# Patient Record
Sex: MALE | Race: WHITE | NOT HISPANIC OR LATINO | Employment: OTHER | URBAN - METROPOLITAN AREA
[De-identification: names, ages, dates, MRNs, and addresses within clinical notes are randomized per-mention and may not be internally consistent; named-entity substitution may affect disease eponyms.]

---

## 2017-01-17 ENCOUNTER — LAB VISIT (OUTPATIENT)
Dept: LAB | Facility: HOSPITAL | Age: 67
End: 2017-01-17
Attending: FAMILY MEDICINE
Payer: MEDICARE

## 2017-01-17 DIAGNOSIS — Z00.00 WELL ADULT EXAM: ICD-10-CM

## 2017-01-17 DIAGNOSIS — Z12.5 PROSTATE CANCER SCREENING: ICD-10-CM

## 2017-01-17 DIAGNOSIS — Z11.59 NEED FOR HEPATITIS C SCREENING TEST: ICD-10-CM

## 2017-01-17 DIAGNOSIS — I10 ESSENTIAL HYPERTENSION: ICD-10-CM

## 2017-01-17 DIAGNOSIS — I45.10 RBBB: ICD-10-CM

## 2017-01-17 DIAGNOSIS — I87.2 VENOUS INSUFFICIENCY: ICD-10-CM

## 2017-01-17 DIAGNOSIS — L40.9 PSORIASIS: ICD-10-CM

## 2017-01-17 LAB
25(OH)D3+25(OH)D2 SERPL-MCNC: 27 NG/ML
ALBUMIN SERPL BCP-MCNC: 3.7 G/DL
ALP SERPL-CCNC: 71 U/L
ALT SERPL W/O P-5'-P-CCNC: 12 U/L
ANION GAP SERPL CALC-SCNC: 6 MMOL/L
AST SERPL-CCNC: 16 U/L
BASOPHILS # BLD AUTO: 0.01 K/UL
BASOPHILS NFR BLD: 0.1 %
BILIRUB SERPL-MCNC: 0.9 MG/DL
BUN SERPL-MCNC: 18 MG/DL
CALCIUM SERPL-MCNC: 9.1 MG/DL
CHLORIDE SERPL-SCNC: 108 MMOL/L
CHOLEST/HDLC SERPL: 3.4 {RATIO}
CO2 SERPL-SCNC: 28 MMOL/L
CREAT SERPL-MCNC: 1 MG/DL
DIFFERENTIAL METHOD: ABNORMAL
EOSINOPHIL # BLD AUTO: 0.1 K/UL
EOSINOPHIL NFR BLD: 0.7 %
ERYTHROCYTE [DISTWIDTH] IN BLOOD BY AUTOMATED COUNT: 12.7 %
EST. GFR  (AFRICAN AMERICAN): >60 ML/MIN/1.73 M^2
EST. GFR  (NON AFRICAN AMERICAN): >60 ML/MIN/1.73 M^2
GLUCOSE SERPL-MCNC: 90 MG/DL
HCT VFR BLD AUTO: 42.3 %
HCV AB SERPL QL IA: NEGATIVE
HDL/CHOLESTEROL RATIO: 29.8 %
HDLC SERPL-MCNC: 131 MG/DL
HDLC SERPL-MCNC: 39 MG/DL
HGB BLD-MCNC: 14 G/DL
LDLC SERPL CALC-MCNC: 76.6 MG/DL
LYMPHOCYTES # BLD AUTO: 1.9 K/UL
LYMPHOCYTES NFR BLD: 27 %
MCH RBC QN AUTO: 32.3 PG
MCHC RBC AUTO-ENTMCNC: 33.1 %
MCV RBC AUTO: 98 FL
MONOCYTES # BLD AUTO: 0.9 K/UL
MONOCYTES NFR BLD: 12.8 %
NEUTROPHILS # BLD AUTO: 4.1 K/UL
NEUTROPHILS NFR BLD: 59.3 %
NONHDLC SERPL-MCNC: 92 MG/DL
PLATELET # BLD AUTO: 282 K/UL
PMV BLD AUTO: 10.5 FL
POTASSIUM SERPL-SCNC: 4.3 MMOL/L
PROT SERPL-MCNC: 7 G/DL
RBC # BLD AUTO: 4.34 M/UL
SODIUM SERPL-SCNC: 142 MMOL/L
T4 FREE SERPL-MCNC: 0.92 NG/DL
TRIGL SERPL-MCNC: 77 MG/DL
TSH SERPL DL<=0.005 MIU/L-ACNC: 1.29 UIU/ML
WBC # BLD AUTO: 6.97 K/UL

## 2017-01-17 PROCEDURE — 84443 ASSAY THYROID STIM HORMONE: CPT

## 2017-01-17 PROCEDURE — 80061 LIPID PANEL: CPT

## 2017-01-17 PROCEDURE — 36415 COLL VENOUS BLD VENIPUNCTURE: CPT | Mod: PO

## 2017-01-17 PROCEDURE — 80053 COMPREHEN METABOLIC PANEL: CPT

## 2017-01-17 PROCEDURE — 82306 VITAMIN D 25 HYDROXY: CPT

## 2017-01-17 PROCEDURE — 86803 HEPATITIS C AB TEST: CPT

## 2017-01-17 PROCEDURE — 85025 COMPLETE CBC W/AUTO DIFF WBC: CPT

## 2017-01-17 PROCEDURE — 84439 ASSAY OF FREE THYROXINE: CPT

## 2017-01-23 ENCOUNTER — OFFICE VISIT (OUTPATIENT)
Dept: INTERNAL MEDICINE | Facility: CLINIC | Age: 67
End: 2017-01-23
Payer: MEDICARE

## 2017-01-23 VITALS
HEIGHT: 69 IN | HEART RATE: 66 BPM | RESPIRATION RATE: 14 BRPM | WEIGHT: 165.13 LBS | SYSTOLIC BLOOD PRESSURE: 132 MMHG | DIASTOLIC BLOOD PRESSURE: 82 MMHG | TEMPERATURE: 99 F | BODY MASS INDEX: 24.46 KG/M2

## 2017-01-23 DIAGNOSIS — J30.1 SEASONAL ALLERGIC RHINITIS DUE TO POLLEN: ICD-10-CM

## 2017-01-23 DIAGNOSIS — Z23 NEED FOR PROPHYLACTIC VACCINATION AND INOCULATION AGAINST INFLUENZA: ICD-10-CM

## 2017-01-23 DIAGNOSIS — L40.9 PSORIASIS: ICD-10-CM

## 2017-01-23 DIAGNOSIS — I87.2 VENOUS INSUFFICIENCY: ICD-10-CM

## 2017-01-23 DIAGNOSIS — Z23 NEED FOR 23-POLYVALENT PNEUMOCOCCAL POLYSACCHARIDE VACCINE: ICD-10-CM

## 2017-01-23 DIAGNOSIS — Z00.00 WELL ADULT EXAM: Primary | ICD-10-CM

## 2017-01-23 DIAGNOSIS — I10 ESSENTIAL HYPERTENSION: ICD-10-CM

## 2017-01-23 DIAGNOSIS — I45.10 RBBB: ICD-10-CM

## 2017-01-23 PROCEDURE — 90732 PPSV23 VACC 2 YRS+ SUBQ/IM: CPT | Mod: S$GLB,,, | Performed by: FAMILY MEDICINE

## 2017-01-23 PROCEDURE — 3079F DIAST BP 80-89 MM HG: CPT | Mod: S$GLB,,, | Performed by: FAMILY MEDICINE

## 2017-01-23 PROCEDURE — 90662 IIV NO PRSV INCREASED AG IM: CPT | Mod: S$GLB,,, | Performed by: FAMILY MEDICINE

## 2017-01-23 PROCEDURE — 3075F SYST BP GE 130 - 139MM HG: CPT | Mod: S$GLB,,, | Performed by: FAMILY MEDICINE

## 2017-01-23 PROCEDURE — G0009 ADMIN PNEUMOCOCCAL VACCINE: HCPCS | Mod: S$GLB,,, | Performed by: FAMILY MEDICINE

## 2017-01-23 PROCEDURE — 99999 PR PBB SHADOW E&M-EST. PATIENT-LVL III: CPT | Mod: PBBFAC,,, | Performed by: FAMILY MEDICINE

## 2017-01-23 PROCEDURE — 99397 PER PM REEVAL EST PAT 65+ YR: CPT | Mod: 25,S$GLB,, | Performed by: FAMILY MEDICINE

## 2017-01-23 PROCEDURE — G0008 ADMIN INFLUENZA VIRUS VAC: HCPCS | Mod: S$GLB,,, | Performed by: FAMILY MEDICINE

## 2017-01-23 RX ORDER — IRBESARTAN 150 MG/1
150 TABLET ORAL NIGHTLY
Qty: 30 TABLET | Refills: 11 | Status: SHIPPED | OUTPATIENT
Start: 2017-01-23 | End: 2017-05-30 | Stop reason: SDUPTHER

## 2017-01-23 NOTE — MR AVS SNAPSHOT
[place] - Internal Medicine   [facility]  [place] [address]  Phone: [phone]  Fax: [phone]                  Joseph Rizvi Jr.   2017 1:00 PM   Office Visit    Description:  Male : 1950   Provider:  Hugo Lara MD   Department:  [place] - [facility] Medicine           Reason for Visit     Annual Exam           Diagnoses this Visit        Comments    Well adult exam    -  Primary     Essential hypertension         RBBB         Venous insufficiency         Psoriasis         Need for prophylactic vaccination and inoculation against influenza         Need for 23-polyvalent pneumococcal polysaccharide vaccine                To Do List           Future Appointments        Provider Department Dept Phone    2017 1:00 PM Hugo Lara MD [facility] Internal Medicine [phone]      Goals (5 Years of Data)     None      Follow-Up and Disposition     Return if symptoms worsen or fail to improve.       These Medications        Disp Refills Start End    irbesartan (AVAPRO) 150 MG tablet 30 tablet 11 2017     Take 1 tablet (150 mg total) by mouth every evening. - Oral    Pharmacy: [facility]/pharmacy #[id number] - [place] - [address]  #: [phone]         [facility] On Call     [facility] On Call Nurse Care Line -  Assistance  Registered nurses in the [facility] On Call Center provide clinical advisement, health education, appointment booking, and other advisory services.  Call for this free service at [phone].             Medications           Message regarding Medications     Verify the changes and/or additions to your medication regime listed below are the same as discussed with your clinician today.  If any of these changes or additions are incorrect, please notify your healthcare provider.        CHANGE how you are taking these medications     Start Taking Instead of    irbesartan (AVAPRO) 150 MG tablet irbesartan (AVAPRO) 150 MG tablet     "Dosage:  Take 1 tablet (150 mg total) by mouth every evening. Dosage:  TAKE 1 TABLET BY MOUTH EVERY EVENING.    Reason for Change:  Reorder            Verify that the below list of medications is an accurate representation of the medications you are currently taking.  If none reported, the list may be blank. If incorrect, please contact your healthcare provider. Carry this list with you in case of emergency.           Current Medications     cyanocobalamin (VITAMIN B-12) 1000 MCG tablet Take 100 mcg by mouth once daily.    irbesartan (AVAPRO) 150 MG tablet Take 1 tablet (150 mg total) by mouth every evening.    vitamin E 1000 UNIT capsule Take 1,000 Units by mouth once daily.    vitamin E 200 UNIT capsule Take 200 Units by mouth once daily.           Clinical Reference Information           Vital Signs - Last Recorded  Most recent update: 1/23/2017 12:43 PM by Reyna Roger    BP Pulse Temp Resp Ht Wt    132/82 (BP Location: Left arm, Patient Position: Sitting, BP Method: Manual) 66 98.9 °F (37.2 °C) (Oral) 14 5' 9" (1.753 m) 74.9 kg (165 lb 2 oz)    BMI                24.38 kg/m2          Blood Pressure          Most Recent Value    BP  132/82      Allergies as of 1/23/2017     No Known Allergies      Immunizations Administered on Date of Encounter - 1/23/2017     Name Date Dose VIS Date Route    Pneumococcal Polysaccharide - 23 Valent  Incomplete 0.5 mL 4/24/2015 Intramuscular      Orders Placed During Today's Visit      Normal Orders This Visit    Pneumococcal Polysaccharide Vaccine (23 Valent) (SQ/IM)       "

## 2017-01-23 NOTE — PROGRESS NOTES
Subjective:       Patient ID: Joseph Rizvi Jr. is a 67 y.o. male.    Chief Complaint: Annual Exam    HPI 67-year-old white male presents to clinic today for annual physical exam.  He continues to have well-controlled hypertension on Avapro 150 mg daily.  He also continues to be treated for psoriasis by Dr. Medel, dermatology.  He has been followed by vascular medicine secondary to claudications in the past.  At this time he reports minimal claudications and also states that he has not uses compression stockings.  He has a past surgical history of lipoma removal from the growing region.  He has a family history of schizophrenia.  His mother passed away from an overdose.  His father passed away from dementia and heart disease.  He is up-to-date with colonoscopy which was performed in 2015.  It is recommended that he repeat the colonoscopy in 5 years.  He is up-to-date with flu vaccine and Prevnar vaccine.  Shingles vaccine and Pneumovax have been discussed.  Finally, he reports that he is recovering from a virus but continues to have postnasal drip and runny nose.  He has used NyQuil with significant improvement of symptoms.  Review of Systems   Constitutional: Positive for diaphoresis. Negative for appetite change, chills, fatigue and fever.   HENT: Positive for postnasal drip and rhinorrhea. Negative for congestion, ear pain, hearing loss, sinus pressure, sore throat and tinnitus.    Eyes: Negative for redness, itching and visual disturbance.   Respiratory: Positive for chest tightness. Negative for cough and shortness of breath.    Cardiovascular: Negative for chest pain and palpitations.   Gastrointestinal: Negative for abdominal pain, constipation, diarrhea, nausea and vomiting.   Genitourinary: Negative for decreased urine volume, difficulty urinating, dysuria, frequency, hematuria and urgency.   Musculoskeletal: Negative for back pain, myalgias, neck pain and neck stiffness.   Skin: Negative for rash.    Neurological: Negative for dizziness, light-headedness and headaches.   Psychiatric/Behavioral: Negative.        Objective:      Physical Exam   Constitutional: He is oriented to person, place, and time. He appears well-developed and well-nourished. No distress.   HENT:   Head: Normocephalic and atraumatic.   Right Ear: External ear normal.   Left Ear: External ear normal.   Nose: Nose normal.   Mouth/Throat: Oropharynx is clear and moist. No oropharyngeal exudate.   Eyes: Conjunctivae and EOM are normal. Pupils are equal, round, and reactive to light. Right eye exhibits no discharge. Left eye exhibits no discharge. No scleral icterus.   Neck: Normal range of motion. Neck supple. No JVD present. No tracheal deviation present. No thyromegaly present.   Cardiovascular: Normal rate, regular rhythm, normal heart sounds and intact distal pulses.  Exam reveals no gallop and no friction rub.    No murmur heard.  Pulmonary/Chest: Effort normal and breath sounds normal. No stridor. No respiratory distress. He has no wheezes. He has no rales.   Abdominal: Soft. Bowel sounds are normal. He exhibits no distension and no mass. There is no tenderness. There is no rebound and no guarding.   Musculoskeletal: Normal range of motion. He exhibits no edema or tenderness.   Lymphadenopathy:     He has no cervical adenopathy.   Neurological: He is alert and oriented to person, place, and time.   Skin: Skin is warm and dry. No rash noted. He is not diaphoretic. No erythema. No pallor.   Psychiatric: He has a normal mood and affect. His behavior is normal. Judgment and thought content normal.   Nursing note and vitals reviewed.      Assessment:       1. Well adult exam    2. Essential hypertension    3. RBBB    4. Venous insufficiency    5. Psoriasis    6. Need for prophylactic vaccination and inoculation against influenza    7. Need for 23-polyvalent pneumococcal polysaccharide vaccine    8. Seasonal allergic rhinitis due to pollen         Plan:       1.  Labs have been reviewed and are overall within normal limits.  2.  Continue Avapro 150 mg daily.  Hypertension is well controlled.  3.  Right bundle branch block is stable with use of Avapro.  4.  Continue use of compression stockings and follow-up with vascular medicine as scheduled.  5.  Continue follow-up with dermatology as scheduled.  6.  Flu vaccine given.  7.  Pneumovax given.  8.  Recommend use of over-the-counter Flonase nasal spray and over-the-counter Claritin.  9.  Return to clinic as needed or in 6 months for general exam.

## 2017-04-03 RX ORDER — IRBESARTAN 150 MG/1
TABLET ORAL
Qty: 30 TABLET | Refills: 11 | Status: SHIPPED | OUTPATIENT
Start: 2017-04-03 | End: 2017-04-07 | Stop reason: SDUPTHER

## 2017-04-07 ENCOUNTER — TELEPHONE (OUTPATIENT)
Dept: INTERNAL MEDICINE | Facility: CLINIC | Age: 67
End: 2017-04-07

## 2017-04-07 RX ORDER — IRBESARTAN 150 MG/1
150 TABLET ORAL NIGHTLY
Qty: 30 TABLET | Refills: 11 | Status: SHIPPED | OUTPATIENT
Start: 2017-04-07 | End: 2017-04-27 | Stop reason: SDUPTHER

## 2017-04-07 NOTE — TELEPHONE ENCOUNTER
----- Message from Maverick Traylor sent at 4/7/2017  3:45 PM CDT -----  Contact: self [phone]  RX request - refill or new RX.  Is this a refill or new RX:  Refill   RX name and strength: irbesartan (AVAPRO) 150 MG tablet  Directions: TAKE 1 TABLET BY MOUTH EVERY EVENING.  Is this a 30 day or 90 day RX:30    Pharmacy name and phone #: [facility]/pharmacy #[id number]  [phone] (Phone)  [phone] (Fax)  Comments:

## 2017-04-07 NOTE — TELEPHONE ENCOUNTER
----- Message from Angela Perez sent at 4/7/2017  2:32 PM CDT -----  Contact: Self/[phone]  Pt states that pharmacy is stating that scrip tirbesartan (AVAPRO) 150 MG tablet was cancelled. Pt would like to know why. Please advise. Pharmacy is [facility]: [phone] (Phone) or  [phone] (Fax)

## 2017-04-07 NOTE — TELEPHONE ENCOUNTER
Call the pharmacy to informed that prescription was sent on 4/3/17. Pharmacy stated that they will have prescription ready.

## 2017-04-27 ENCOUNTER — OFFICE VISIT (OUTPATIENT)
Dept: INTERNAL MEDICINE | Facility: CLINIC | Age: 67
End: 2017-04-27
Payer: MEDICARE

## 2017-04-27 ENCOUNTER — HOSPITAL ENCOUNTER (OUTPATIENT)
Dept: RADIOLOGY | Facility: HOSPITAL | Age: 67
Discharge: HOME | End: 2017-04-27
Attending: INTERNAL MEDICINE
Payer: MEDICARE

## 2017-04-27 VITALS
SYSTOLIC BLOOD PRESSURE: 164 MMHG | BODY MASS INDEX: 25.21 KG/M2 | WEIGHT: 170.19 LBS | TEMPERATURE: 98 F | HEART RATE: 60 BPM | RESPIRATION RATE: 16 BRPM | DIASTOLIC BLOOD PRESSURE: 90 MMHG | HEIGHT: 69 IN

## 2017-04-27 DIAGNOSIS — S33.5XXA LUMBAR BACK SPRAIN, INITIAL ENCOUNTER: ICD-10-CM

## 2017-04-27 DIAGNOSIS — S13.4XXA INJURY OF NECK, WHIPLASH, INITIAL ENCOUNTER: Primary | ICD-10-CM

## 2017-04-27 DIAGNOSIS — S43.401A SPRAIN OF RIGHT SHOULDER, UNSPECIFIED SHOULDER SPRAIN TYPE, INITIAL ENCOUNTER: ICD-10-CM
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
## 2017-04-27 DIAGNOSIS — S13.4XXA INJURY OF NECK, WHIPLASH, INITIAL ENCOUNTER: ICD-10-CM

## 2017-04-27 PROCEDURE — 3077F SYST BP >= 140 MM HG: CPT | Mod: S$GLB,,, | Performed by: INTERNAL MEDICINE

## 2017-04-27 PROCEDURE — 99999 PR PBB SHADOW E&M-EST. PATIENT-LVL III: CPT | Mod: PBBFAC,,, | Performed by: INTERNAL MEDICINE

## 2017-04-27 PROCEDURE — 1125F AMNT PAIN NOTED PAIN PRSNT: CPT | Mod: S$GLB,,, | Performed by: INTERNAL MEDICINE

## 2017-04-27 PROCEDURE — 1159F MED LIST DOCD IN RCRD: CPT | Mod: S$GLB,,, | Performed by: INTERNAL MEDICINE

## 2017-04-27 PROCEDURE — 3080F DIAST BP >= 90 MM HG: CPT | Mod: S$GLB,,, | Performed by: INTERNAL MEDICINE

## 2017-04-27 PROCEDURE — 72050 X-RAY EXAM NECK SPINE 4/5VWS: CPT | Mod: 26,,, | Performed by: RADIOLOGY

## 2017-04-27 PROCEDURE — 72050 X-RAY EXAM NECK SPINE 4/5VWS: CPT | Mod: TC,PO

## 2017-04-27 PROCEDURE — 96372 THER/PROPH/DIAG INJ SC/IM: CPT | Mod: S$GLB,,, | Performed by: INTERNAL MEDICINE

## 2017-04-27 PROCEDURE — 1160F RVW MEDS BY RX/DR IN RCRD: CPT | Mod: S$GLB,,, | Performed by: INTERNAL MEDICINE

## 2017-04-27 PROCEDURE — 99214 OFFICE O/P EST MOD 30 MIN: CPT | Mod: 25,S$GLB,, | Performed by: INTERNAL MEDICINE

## 2017-04-27 RX ORDER — NAPROXEN 500 MG/1
500 TABLET ORAL 2 TIMES DAILY WITH MEALS
Qty: 60 TABLET | Refills: 1 | Status: SHIPPED | OUTPATIENT
Start: 2017-04-27 | End: 2017-05-27

## 2017-04-27 RX ORDER — CYCLOBENZAPRINE HCL 10 MG
10 TABLET ORAL 3 TIMES DAILY PRN
Qty: 30 TABLET | Refills: 1 | Status: SHIPPED | OUTPATIENT
Start: 2017-04-27 | End: 2017-05-27

## 2017-04-27 RX ORDER — TRIAMCINOLONE ACETONIDE 40 MG/ML
40 INJECTION, SUSPENSION INTRA-ARTICULAR; INTRAMUSCULAR
Status: COMPLETED | OUTPATIENT
Start: 2017-04-27 | End: 2017-04-27

## 2017-04-27 RX ADMIN — TRIAMCINOLONE ACETONIDE 40 MG: 40 INJECTION, SUSPENSION INTRA-ARTICULAR; INTRAMUSCULAR at 08:04
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
## 2017-05-30 RX ORDER — IRBESARTAN 150 MG/1
150 TABLET ORAL NIGHTLY
Qty: 90 TABLET | Refills: 3 | Status: SHIPPED | OUTPATIENT
Start: 2017-05-30 | End: 2018-01-24 | Stop reason: SDUPTHER

## 2017-05-30 RX ORDER — IRBESARTAN 150 MG/1
150 TABLET ORAL NIGHTLY
Qty: 30 TABLET | Refills: 11 | Status: SHIPPED | OUTPATIENT
Start: 2017-05-30 | End: 2017-05-30 | Stop reason: SDUPTHER

## 2017-07-24 ENCOUNTER — OFFICE VISIT (OUTPATIENT)
Dept: CARDIOLOGY | Facility: CLINIC | Age: 67
End: 2017-07-24
Payer: MEDICARE

## 2017-07-24 VITALS
HEART RATE: 60 BPM | WEIGHT: 174 LBS | BODY MASS INDEX: 25.77 KG/M2 | SYSTOLIC BLOOD PRESSURE: 134 MMHG | DIASTOLIC BLOOD PRESSURE: 70 MMHG | HEIGHT: 69 IN

## 2017-07-24 DIAGNOSIS — I10 ESSENTIAL HYPERTENSION: Primary | ICD-10-CM

## 2017-07-24 DIAGNOSIS — I87.2 VENOUS INSUFFICIENCY: ICD-10-CM

## 2017-07-24 DIAGNOSIS — I45.10 RBBB: ICD-10-CM

## 2017-07-24 PROCEDURE — 1159F MED LIST DOCD IN RCRD: CPT | Mod: S$GLB,,, | Performed by: INTERNAL MEDICINE

## 2017-07-24 PROCEDURE — 99214 OFFICE O/P EST MOD 30 MIN: CPT | Mod: S$GLB,,, | Performed by: INTERNAL MEDICINE

## 2017-07-24 PROCEDURE — 1126F AMNT PAIN NOTED NONE PRSNT: CPT | Mod: S$GLB,,, | Performed by: INTERNAL MEDICINE

## 2017-07-24 PROCEDURE — 99999 PR PBB SHADOW E&M-EST. PATIENT-LVL III: CPT | Mod: PBBFAC,,, | Performed by: INTERNAL MEDICINE

## 2017-07-24 NOTE — PATIENT INSTRUCTIONS
Self-Care for Spider and Varicose Veins  Your healthcare provider may suggest that you try self-care. Exercising and maintaining a healthy weight may keep problem veins from getting worse. Wearing elastic stockings and elevating your legs can help improve blood flow. Taking breaks when you sit or stand helps, too.     The top of the elastic stocking should be below the bend in your knee for a proper fit.   Exercising  Exercising is good for your veins because it improves blood flow. Walking, cycling, or swimming are great exercises for vein health. But be sure to check with your healthcare provider before starting any exercise program. Also, keep these hints in mind:  · When exercising, start out slowly and try to build up to 30 minutes on most days.  · Elevate your legs above heart level after exercise to keep blood from pooling in veins.  Maintaining a healthy weight  Being overweight puts extra pressure on your veins. To maintain a healthy weight, try these tips:  · Choose lean meats, fish, and skinless chicken.  · Use low-fat dairy products.  · Eat foods high in fiber, such as whole grains, fruits, and vegetables.  · Cut down on sugar, salt, and saturated and trans fats.  · Exercise regularly.  Wearing elastic stockings  Elastic stockings gently squeeze veins so blood flows upward. If you need elastic stockings, your healthcare provider can prescribe them for you. Follow your healthcare providers advice about how and when to wear them. Elastic stockings come in several different levels of pressure. Ask your healthcare provider which level of pressure would benefit you the most.   Elevating your legs  Raising your legs above heart level will help relieve swelling and keep blood from pooling in veins. Try to elevate your legs for 15 to 20 minutes at the end of the day, and whenever youre relaxing. To make sure your legs are raised above heart level, prop them up on cushions or large pillows.  When sitting and  standing  To keep blood moving when you have to sit or stand for long periods, try these tips:  · At work, take walking breaks instead of coffee breaks. Walk during your lunch hour. Or try flexing your feet up and down 10 times each hour.  · When standing, raise yourself up and down on your toes, or rock back and forth on your heels.  Date Last Reviewed: 5/1/2016 © 2000-2016 [employer]. [address] [place] [address], [place] [address]. All rights reserved. This information is not intended as a substitute for professional medical care. Always follow your healthcare professional's instructions.        Understanding Spider and Varicose Veins  Do you often hide your legs because of the way they look? You may have noticed tiny red or blue bursts (spider veins). Or maybe you have veins that bulge or look twisted (varicose veins). If so, there are treatments that can help  What are the symptoms?  Spider veins or varicose veins may never be a problem. But sometimes they can cause legs to ache or swell. Your legs may also feel heavy and tired, or like theyre burning. These symptoms may be more severe at the end of the day. Prolonged sitting or standing can also make your symptoms worse.  Who gets spider and varicose veins?  Anyone can get spider or varicose veins. But vein problems tend to be hereditary (run in families). Other factors that can affect veins include:  · Pregnancy, hormones, and birth control pills  · A job where you stand or sit a lot  · Extra weight or lack of exercise  · Age     Spider veins look like tiny webs on the ankles, legs, and upper thighs.      Ropy, dark blue, red, or flesh-colored varicose veins are most common on the thighs, calves, and feet.     What can be done?  Spider and varicose veins can affect the way you feel about yourself. Talk to your healthcare provider about your concerns. There are treatments that can ease symptoms and make your legs look better.  Your treatment  choices  Treatment may include self-care, sclerotherapy (injecting veins with a chemical), surgery, or newer nonsurgical minimally invasive therapies. Spider veins and some varicose veins can be treated with sclerotherapy. Large varicose veins can often be treated with newer minimally invasive procedures and, in rare cases, surgery may be needed.   Date Last Reviewed: 5/1/2016  © 5357-0737 [employer]. [address] [place] [address], [place] [address]. All rights reserved. This information is not intended as a substitute for professional medical care. Always follow your healthcare professional's instructions.

## 2017-08-15 ENCOUNTER — TELEPHONE (OUTPATIENT)
Dept: INTERNAL MEDICINE | Facility: CLINIC | Age: 67
End: 2017-08-15

## 2017-08-15 DIAGNOSIS — L08.9 TOE INFECTION: Primary | ICD-10-CM

## 2017-08-18 ENCOUNTER — OFFICE VISIT (OUTPATIENT)
Dept: PODIATRY | Facility: CLINIC | Age: 67
End: 2017-08-18
Payer: MEDICARE

## 2017-08-18 ENCOUNTER — OFFICE VISIT (OUTPATIENT)
Dept: INTERNAL MEDICINE | Facility: CLINIC | Age: 67
End: 2017-08-18
Payer: MEDICARE

## 2017-08-18 VITALS
HEIGHT: 69 IN | RESPIRATION RATE: 16 BRPM | HEART RATE: 72 BPM | WEIGHT: 173.31 LBS | TEMPERATURE: 99 F | BODY MASS INDEX: 25.67 KG/M2 | DIASTOLIC BLOOD PRESSURE: 61 MMHG | SYSTOLIC BLOOD PRESSURE: 130 MMHG

## 2017-08-18 VITALS
SYSTOLIC BLOOD PRESSURE: 145 MMHG | BODY MASS INDEX: 25.62 KG/M2 | HEART RATE: 58 BPM | HEIGHT: 69 IN | WEIGHT: 173 LBS | DIASTOLIC BLOOD PRESSURE: 77 MMHG

## 2017-08-18 DIAGNOSIS — L40.9 PSORIASIS: ICD-10-CM

## 2017-08-18 DIAGNOSIS — L60.0 INGROWN NAIL: Primary | ICD-10-CM

## 2017-08-18 DIAGNOSIS — L60.9 DISEASE OF NAIL: ICD-10-CM

## 2017-08-18 DIAGNOSIS — M79.631 RIGHT FOREARM PAIN: Primary | ICD-10-CM

## 2017-08-18 DIAGNOSIS — I87.2 VENOUS INSUFFICIENCY: ICD-10-CM

## 2017-08-18 PROCEDURE — 1159F MED LIST DOCD IN RCRD: CPT | Mod: S$GLB,,, | Performed by: PODIATRIST

## 2017-08-18 PROCEDURE — 99203 OFFICE O/P NEW LOW 30 MIN: CPT | Mod: S$GLB,,, | Performed by: PODIATRIST

## 2017-08-18 PROCEDURE — 99999 PR PBB SHADOW E&M-EST. PATIENT-LVL III: CPT | Mod: PBBFAC,,, | Performed by: PODIATRIST

## 2017-08-18 PROCEDURE — 1126F AMNT PAIN NOTED NONE PRSNT: CPT | Mod: S$GLB,,, | Performed by: PODIATRIST

## 2017-08-18 PROCEDURE — 87076 CULTURE ANAEROBE IDENT EACH: CPT

## 2017-08-18 PROCEDURE — 87102 FUNGUS ISOLATION CULTURE: CPT

## 2017-08-18 PROCEDURE — 3078F DIAST BP <80 MM HG: CPT | Mod: S$GLB,,, | Performed by: INTERNAL MEDICINE

## 2017-08-18 PROCEDURE — 87015 SPECIMEN INFECT AGNT CONCNTJ: CPT

## 2017-08-18 PROCEDURE — 3075F SYST BP GE 130 - 139MM HG: CPT | Mod: S$GLB,,, | Performed by: INTERNAL MEDICINE

## 2017-08-18 PROCEDURE — 99999 PR PBB SHADOW E&M-EST. PATIENT-LVL III: CPT | Mod: PBBFAC,,, | Performed by: INTERNAL MEDICINE

## 2017-08-18 PROCEDURE — 87116 MYCOBACTERIA CULTURE: CPT

## 2017-08-18 PROCEDURE — 3008F BODY MASS INDEX DOCD: CPT | Mod: S$GLB,,, | Performed by: INTERNAL MEDICINE

## 2017-08-18 PROCEDURE — 87205 SMEAR GRAM STAIN: CPT

## 2017-08-18 PROCEDURE — 3078F DIAST BP <80 MM HG: CPT | Mod: S$GLB,,, | Performed by: PODIATRIST

## 2017-08-18 PROCEDURE — 3074F SYST BP LT 130 MM HG: CPT | Mod: S$GLB,,, | Performed by: PODIATRIST

## 2017-08-18 PROCEDURE — 99213 OFFICE O/P EST LOW 20 MIN: CPT | Mod: S$GLB,,, | Performed by: INTERNAL MEDICINE

## 2017-08-18 PROCEDURE — 87075 CULTR BACTERIA EXCEPT BLOOD: CPT

## 2017-08-18 PROCEDURE — 87077 CULTURE AEROBIC IDENTIFY: CPT | Mod: 59

## 2017-08-18 PROCEDURE — 1125F AMNT PAIN NOTED PAIN PRSNT: CPT | Mod: S$GLB,,, | Performed by: INTERNAL MEDICINE

## 2017-08-18 PROCEDURE — 87070 CULTURE OTHR SPECIMN AEROBIC: CPT

## 2017-08-18 PROCEDURE — 3008F BODY MASS INDEX DOCD: CPT | Mod: S$GLB,,, | Performed by: PODIATRIST

## 2017-08-18 PROCEDURE — 87186 SC STD MICRODIL/AGAR DIL: CPT | Mod: 59

## 2017-08-18 PROCEDURE — 1159F MED LIST DOCD IN RCRD: CPT | Mod: S$GLB,,, | Performed by: INTERNAL MEDICINE

## 2017-08-18 RX ORDER — CEPHALEXIN 500 MG/1
500 CAPSULE ORAL 4 TIMES DAILY
Qty: 28 CAPSULE | Refills: 0 | Status: SHIPPED | OUTPATIENT
Start: 2017-08-18 | End: 2017-08-22 | Stop reason: ALTCHOICE

## 2017-08-18 NOTE — LETTER
August 21, 2017      Hugo Lara MD  [address] [facility] [address]           [place] - Podiatry  [address] [facility] [address]  Phone: [phone]          Patient: Joseph Rizvi Jr.   MR Number: [id number]   YOB: 1950   Date of Visit: 8/18/2017       Dear Dr. Hugo Lara:    Thank you for referring Joseph Rizvi to me for evaluation. Attached you will find relevant portions of my assessment and plan of care.    If you have questions, please do not hesitate to call me. I look forward to following Joseph Rizvi along with you.    Sincerely,    Darin Chavez Jr., DPM    Enclosure  CC:  No Recipients    If you would like to receive this communication electronically, please contact [email or web address] or [phone] to request more information on [employer] access.    For providers and/or their staff who would like to refer a patient to [facility], please contact us through our one-stop-shop provider referral line, [facility], at [phone].    If you feel you have received this communication in error or would no longer like to receive these types of communications, please e-mail [email or web address]

## 2017-08-18 NOTE — PATIENT INSTRUCTIONS
Instructions for Care after Ingrown Nail removal    General Information: Stay off your feet as much as possible today. You may wear a surgical shoe, sandal or any open toed shoe that does not squeeze, constrict or put pressure on your toe(s). Your toe(s) may remain numb for up to 2-24 hours after the procedure. Although most patients can wear a closed loose fitting shoe after the first week, the toe will heal faster the more you use the open toed shoe in the first 2-3  weeks. Please contact our office if you have any questions or concerns.    Bleeding: Slight bleeding, discoloration and drainage are normal. Due to the chemical used there may be some yellow-clear drainage coming from the toe for 2-3 weeks.    Discomfort: You can elevate your foot to help alleviate minor swelling, bleeding and discomfort. You may also take Advil, Tylenol or other over the counter pain medications to help alleviate pain. Call our office if the pain is not well controlled. Most patients have very little discomfort as long as they minimize their walking for the first 24 hours and do not bump the toe.    Removing the Bandage: Starting the day after surgery, carefully remove the dressing and shower or bathe as normal. It is Ok to get the bandage soaking wet in the shower and when you remove it, it should not stick to the surgery site.    Dressing Options- Traditional Method:  1. Soaking two times a day in WARM water with Epsom salts or diluted Povidone Iodine  (Betadine) for 15-20 minutes. You will need to purchase these products from the pharmacy.  2. Dry toe then apply an antibiotic cream or ointment such as Neosporin or Polysporin plus or  Garamycin and cover with a 2 x 2 inch size gauze and then secure with a 1 inch band aid.  3. In the second week, take the dressing off at bedtime to air dry the toe.  4. If the toe is infected take the Antibiotic Pills as directed until finished.      Understanding Ingrown Toenails    An ingrown  nail is the result of a nail growing into the skin that surrounds it. This often occurs at either edge of the big toe. Ingrown nails may be caused by improper trimming, inherited nail deformities, injuries, fungal infections, or pressure.  Symptoms  Ingrown nails may cause pain at the tip of the toe or all the way to the base of the toe. The pain is often worse while walking. An ingrown nail may also lead to infection, inflammation, or a more serious condition. If its infected, you might see pus or redness.  Evaluation  To determine the extent of your problem, your healthcare provider examines and possibly presses the painful area. If other problems are suspected, blood tests, cultures, and X-rays may be done as well.  Treatment  If the nail isnt infected, your healthcare provider may trim the corner of it to help relieve your symptoms. He or she may need to remove one side of your nail back to the cuticle. The base of the nail may then be treated with a chemical to keep the ingrown part from growing back. Severe infections or ingrown nails may require antibiotics and temporary or permanent removal of a portion of the nail. To prevent pain, a local anesthetic may be used in these procedures. This treatment is usually done at your healthcare provider's office.  Prevention  Many nail problems can be prevented by wearing the right shoes and trimming your nails properly. To help avoid infection, keep your feet clean and dry. If you have diabetes, talk with your healthcare provider before doing any foot self-care.  · The right shoes: Get your feet measured (your size may change as you age). Wear shoes that are supportive and roomy enough for your toes to wiggle. Look for shoes made of natural materials such as leather, which allow your feet to breathe.  · Proper trimming: To avoid problems, trim your toenails straight across without cutting down into the corners. If you cant trim your own nails, ask your healthcare  provider to do so for you.  Date Last Reviewed: 10/1/2016  © 5207-7355 [employer]. [address] [place] [address], [place] [address]. All rights reserved. This information is not intended as a substitute for professional medical care. Always follow your healthcare professional's instructions.          Understanding Thickened Nails    There are several causes of very thick or crumbling nails. They can be caused by injuries or pressure from shoes. Fungal infections are a common cause. Diabetes, psoriasis, or vascular disease are other possible causes.  Symptoms  Along with thickening, the nail may appear ridged, brittle, or yellowish. It may also feel painful when pressure is put on it. After a while, a thickened nail may loosen and fall off.  Evaluation  Because thickened nails may be a symptom of a medical condition, your healthcare provider will look at your medical history. A test may be done to check for fungal infection. The thickness and color of the nail are examined carefully. This helps determine the cause.  Treatment  · For infection: Oral or topical antifungal medicines may be used to treat infection. These can help prevent sores under the nail. They also keep the fungus from spreading to other nails.  · For thick nails not caused by infection: Thinning the nail may be an option. This can be done by trimming, filing, or grinding.  · For pain: If the nail causes pain, part or all of the nail can be removed with surgery. Never try to remove a nail by yourself.  Prevention  Many nail problems can be prevented by wearing the right shoes and trimming your nails properly. Keep your feet clean and dry to help avoid infection. If you have diabetes, talk with your healthcare provider before doing any foot self-care.  · The right shoes: Get your feet measured. Your size may change as you age. This is due to ligaments that loosen with age and allow the bones in your foot to change position or spread. Wear  shoes that are supportive and roomy enough for your toes to wiggle. Look for shoes made of natural materials, such as leather, which allow your feet to breathe.  · Proper trimming: To avoid problems, trim your toenails straight across. Then file the edges with a nail file. If you cant trim your own nails, ask your healthcare provider to do so for you.  Date Last Reviewed: 10/1/2016  © 1554-6004 [employer]. [address] [place] [address], [place] [address]. All rights reserved. This information is not intended as a substitute for professional medical care. Always follow your healthcare professional's instructions.        What Is Psoriasis?  Psoriasis is a chronic skin disease. Psoriasis can begin at any age. It is most common between ages 30 and 39 and also between ages 50 and 69. Psoriasis affects nearly equal numbers of men and women. In people with this disease, the skin grows too fast. Dead skin cells build up on the skins surface to form inflamed, thick, silvery scales called plaques. Sometimes people form many small lesions that can hurt or have pus in them. Psoriasis does not spread from person to person.  About your symptoms  Psoriasis plaques tend to form on the elbows, knees, scalp, navel, arms, legs, and the penis or vulva. They can be unsightly, painful, and itchy. Plaques on the joints can limit movement. On the fingernails or toenails, psoriasis can cause pitting, a change in nail color, and a change in nail shape. In some cases, psoriasis also causes symptoms that are like arthritis. Symptoms may come and go on their own. Factors such as stress, infection, and certain medications may cause flare-ups. If symptoms bother you, know that many effective medical treatments exist  that can help relieve symptoms for most people with psoriasis. Discuss your treatment options with your health care provider.  External medical treatments  There are many types of external medical treatments that are used  to treat the affected skin lesions. Your health care provider may prescribe one of many types of topical medications. These include strong topical steroids or steroid-sparing agents. You apply them to your skin on a regular basis. Coal tar (a thick black liquid) may be applied to thick plaques. In some cases, the skin may be exposed to a special light in the health care provider's office. Or, you can expose the psoriatic plaques to short periods (5 minutes) of natural sun as directed by your health care provider. This method is called phototherapy. It can be enhanced with the use of psoralen (a type of medication).  Internal medical treatments  Internal treatments are taken orally (by mouth) or given by injection. There are a number of oral medications for more severe psoriasis. Your health care provider can tell you more about these treatments.  Date Last Reviewed: 5/10/2015  © 5506-9742 [employer]. [address] [place] [address]. All rights reserved. This information is not intended as a substitute for professional medical care. Always follow your healthcare professional's instructions.        Psoriasis  Psoriasis is an inflammatory condition that affects the skin and nails. You may have patches of thick, red skin (plaques) covered with silvery scales. These often appear on the elbows, knees, legs, lower back, and scalp.  The plaques itch and can be painful. People with this condition are more likely to have emotional stress and depression.  Psoriasis is not contagious. It cant spread to someone else who touches it. But it can be inherited. It is an autoimmune skin disease. This means that the immune system has an abnormal reaction. It treats healthy skin like it is a foreign substance. This causes skin cells to grow faster than normal and to stack up in raised red patches. Psoriasis is a long-term (chronic) disease. You will have flare-ups that come and go over time.  Smoking, sun  exposure, and alcohol use may affect how often the psoriasis occurs and how long the flare-ups last.  There is no cure, but treatments can offer relief. Treatment can include topical creams, light therapy (phototherapy), and oral or injectable medicines.  Home care  · No specific diet is needed. Eat a healthy, well-balanced diet that includes fresh fruits and vegetables, whole grains, and lean meats. Psoriasis can increase your risk for diabetes and heart disease.  · Increasing omega-3 fatty acids in your diet can help improve dry skin. The best dietary sources are fatty fish (salmon, mackerel, lake trout, albacore tuna) or fish oil (such as cod liver oil). A great way to take fish oil is to add it to a juice, shake, or smoothie. Flaxseeds and flaxseed oil, canola oil, walnuts, soybean, and tofu are converted to omega-3 fatty acid in the body.  · Stay at a healthy weight. Overlapping skin folds can be a site for psoriasis plaques. If you are overweight, talk to your healthcare provider about a weight-loss program.  · Bathing daily can help remove scales and calm inflamed skin. Use lukewarm water and mild soaps that have added oils, fats, and moisturizers. Avoid deodorants, antiperspirants, and antibacterial soaps. These have a drying effect. Many people find it helpful to soak in a tub with added bath oils, oatmeal, apple cider vinegar, or Epsom salts.  · After bathing, put on skin cream (or a skin oil for a stronger effect).  · Some exposure to UV rays from the sun can improve psoriasis. But too much sun can trigger an outbreak. It also raises your risk for skin cancer. Limit sun exposure and use sunscreen on healthy skin (at least 15 SPF).  · If you are prescribed medicine, take it as directed.  · Unless another steroid cream was prescribed, you may use over-the-counter hydrocortisone cream for a few weeks during symptom flare-ups.  · Stop smoking. If you are a long-time smoker, this can be hard. Think about  joining a stop-smoking program.  · Tell your provider if your joints start to ache or get stiff.  · Tell your provider if you notice changes in your fingernails.  · Depression is more common among psoriasis patients. Get help if you notice changes in your mood.  Follow-up care  Follow up with your healthcare provider, or as advised.  When to seek medical advice  Call your healthcare provider right away if any of these occur:  · Skin pain gets worse  · Bleeding from the skin plaques that is hard to control  · Signs of skin infection (redness, increasing pain, swelling, pus)  · Fever of 1 degree, or higher, above your normal temperature, or as directed by your provider  Date Last Reviewed: 8/1/2016  © 1083-2832 [employer]. [address] [place] [address], [place] [address]. All rights reserved. This information is not intended as a substitute for professional medical care. Always follow your healthcare professional's instructions.        Managing Psoriasis     Take baths in warm water to help soften scales.   The success of your medical treatment depends on you. When your healthcare provider gives you a treatment plan, ask when you should expect to see results. Then, follow your plan. If your treatment does not work in the expected time, let your healthcare provider know. Psoriasis is a common disease, and it can respond to many different treatments. It depends on the location, size, and symptoms each person experiences. Some treatments are simple (tar-based therapies or topical steroids), and some are complex (new biologic medications or light therapy). Your healthcare provider will need to personalize your treatment. Psoriasis frequently will get better with treatment, but later worsen if you stop treatment or if a new illness ocurs. In most cases, you can get control of your psoriasis again. You will likely need to see your healthcare provider regularly about treatment options.  Psoriasis self-care  Follow  these steps to help manage your symptoms:  · Take baths to help soften scales. Use warm, not hot, water. To avoid drying out your skin, limit each bath to about 15 minutes. Add bath oil or [place] salts.  · After you bathe, apply lotion right away, while your skin is damp. Dry skin can make symptoms worse.  · Use a scalp treatment as prescribed by your health care provider. There are different solutions and dosages based on your symptoms.   · Promptly seek treatment for any skin injuries because they can cause flare-ups.  · Manage your stress, and use relaxation techniques.  · Expose your psoriatic skin to sunlight for 5 minutes a day, except if you feel that sun exposure makes your psoriasis worse. Use sunscreen on the normal, unaffected skin, but avoid sunburns.  · Use over-the-counter hydrocortisone cream for itching to reduce scaling for active outbreaks. Consult with your healthcare provider for long-term use.  · Stick with treatment that your healthcare provider has recommended for you, especially if it's controlling your psoriasis.  · Avoid abrasive cleansers, harsh detergents, and household chemicals.  Getting good results  Now that you know more about psoriasis, the next step is up to you. Follow your healthcare provider's treatment plan and self-care routine. Doing so can help you control your symptoms. If your symptoms dont improve or they get worse, call your healthcare provider. Psoriasis cant be cured. But its symptoms can be managed.   Date Last Reviewed: 5/10/2015  © 7224-4427 [employer]. [address] [place] [address], [place] [address]. All rights reserved. This information is not intended as a substitute for professional medical care. Always follow your healthcare professional's instructions.

## 2017-08-18 NOTE — PROGRESS NOTES
Subjective:    Patient ID: Joseph Rizvi Jr. is a 67 y.o. male.    Chief Complaint: Nail Problem (left great toe dr dorantes 8/18/17)      HPI:   Joseph is a 67 y.o. male who presents to the clinic complaining of painful ingrown toenail on the left foot.  HPI    Last Podiatry Enc: Visit date not found  Last Enc w/ Me: Visit date not found    Past Medical History:   Diagnosis Date    Bell's palsy     Depression     Hypertension     Psoriasis      Past Surgical History:   Procedure Laterality Date    COLONOSCOPY N/A 10/14/2015    Procedure: COLONOSCOPY;  Surgeon: CYNTHIA Potter MD;  Location: [address];  Service: Endoscopy;  Laterality: N/A;    Lipoma removal      groin area      Social History     Social History    Marital status: Single     Spouse name: N/A    Number of children: N/A    Years of education: N/A     Occupational History          Social History Main Topics    Smoking status: Never Smoker    Smokeless tobacco: Never Used    Alcohol use Yes      Comment: Social     Drug use: No    Sexual activity: Not on file     Other Topics Concern    Not on file     Social History Narrative    No narrative on file         Current Outpatient Prescriptions:     cyanocobalamin (VITAMIN B-12) 1000 MCG tablet, Take 100 mcg by mouth once daily., Disp: , Rfl:     irbesartan (AVAPRO) 150 MG tablet, Take 1 tablet (150 mg total) by mouth every evening., Disp: 90 tablet, Rfl: 3    vitamin E 200 UNIT capsule, Take 200 Units by mouth once daily., Disp: , Rfl:     cephALEXin (KEFLEX) 500 MG capsule, Take 1 capsule (500 mg total) by mouth 4 (four) times daily., Disp: 28 capsule, Rfl: 0   Review of patient's allergies indicates:  No Known Allergies    ROS  ROS Constitutional:  General Appearance: well nourished  Vascular: negative for cramps, edema and bruising  Musculoskeletal: negative for joint paint and joint edema  Skin: negative for rashes and lesions  Neurological:  "negative for burning, tingling and numbness  Gastrointestinal: negative for stomach pain, nausea and vomiting        Objective:        BP (!) 145/77   Pulse (!) 58   Ht 5' 9" (1.753 m)   Wt 78.5 kg (173 lb)   BMI 25.55 kg/m²     Ortho/SPM Exam  Physical Exam  LE exam con't:  V: DP 2/4, PT 2/4, CRT< 3s to all digits tested.     N: SILT in SP/DP/T/Briseida/Saph distributions.    Derm: Skin intact. No erythema, edema or ecchymosis. L hallux nail partially avulsed. Mild serous drainage. All other nails thickened and dystrophic    Ortho:  +Motor EHL/FHL/TA/GA   Compartments soft/compressible. No pain on passive stretch of big toe. No calf  pain.        Assessment:     Imaging / Labs:          1. Ingrown nail - Left Foot    2. Psoriasis    3. Disease of nail    4. Venous insufficiency          Plan:       Orders Placed This Encounter    Nail Removal    Gram Stain    AFB Culture & Smear    Fungus Culture    Culture, Anaerobe    Aerobic Culture    cephALEXin (KEFLEX) 500 MG capsule     Nail Removal  Date/Time: 8/21/2017 4:00 PM  Performed by: MING IGNACIO JR.  Authorized by: MING IGNACIO JR.     Consent Done?:  Yes (Verbal)    Location:  Left foot  Location detail:  Left big toe  Local anesthetic: lidocaine spray  Anesthetic total (ml):  0  Preparation:  Skin prepped with alcohol and skin prepped with Betadine    Amount removed:  Complete  Wedge excision of skin of nail fold: No    Nail bed sutured?: No    Nail matrix removed:  None  Removed nail replaced and anchored: No    Dressing applied:  Dressing applied  Patient tolerance:  Patient tolerated the procedure well with no immediate complications        Joseph was seen today for nail problem.    Diagnoses and all orders for this visit:    Ingrown nail - Left Foot  -     Gram Stain; Future  -     AFB Culture & Smear; Future  -     Fungus Culture; Future  -     Culture, Anaerobe; Future  -     Aerobic Culture; Future  -     cephALEXin (KEFLEX) 500 MG " capsule; Take 1 capsule (500 mg total) by mouth 4 (four) times daily.  -     Gram Stain  -     AFB Culture & Smear  -     Fungus Culture  -     Culture, Anaerobe  -     Aerobic Culture  -     Nail Removal    Psoriasis    Disease of nail    Venous insufficiency  -     US Lower Extremity Veins Bilateral Insuf        Joseph Rizvi Jr. is a 67 y.o. male presenting w/   1. Ingrown nail - Left Foot    2. Psoriasis    3. Disease of nail    4. Venous insufficiency        -pt seen, evaluated, and managed  -dx discussed in detail. All questions/concerns addressed  -all tx options discussed. All alternatives, risks, benefits of all txs discussed  -The patient was educated regarding the above diagnosis. We discussed conservative care options regarding shoe wear and/or padding.  -rxs dispensed: none  -discussed ingrowing toenails and all tx options. Pt opts for procedural removal   -px as above  -dressings applied  -cxs obtained and sent for micro analysis  -pt placed on PO keflex as precaution  -besides being ingrown, nail looks mycotic vs psoriatic  -pt to perform epsom salt or betadine soaks once daily x 2wks. Written instructions dispensed  -keep toe covered with triple abx ointment + bandaid x 2wks    rtc 2 wks    Return in about 2 weeks (around 9/1/2017).

## 2017-08-18 NOTE — PROGRESS NOTES
Subjective:       Patient ID: Joseph Rizvi Jr. is a 67 y.o. male.    Chief Complaint: Arm Pain (rt)    HPI   Pt here for evaluation of right medial forearm pain which took place after a palm branch from a tree hit the area yesterday while at work. He denies any puncture wound in the area. Pt has been experiencing mild pain in the area described as a dull ache but no significant swelling.   Review of Systems   Constitutional: Negative for activity change, appetite change, chills, diaphoresis, fatigue, fever and unexpected weight change.   HENT: Negative for postnasal drip, rhinorrhea, sinus pressure, sneezing, sore throat, trouble swallowing and voice change.    Respiratory: Negative for cough, shortness of breath and wheezing.    Cardiovascular: Negative for chest pain, palpitations and leg swelling.   Gastrointestinal: Negative for abdominal pain, blood in stool, constipation, diarrhea, nausea and vomiting.   Genitourinary: Negative for dysuria.   Musculoskeletal: Positive for myalgias. Negative for arthralgias.   Skin: Negative for rash and wound.   Allergic/Immunologic: Negative for environmental allergies and food allergies.   Hematological: Negative for adenopathy. Does not bruise/bleed easily.       Objective:      Physical Exam   Constitutional: He is oriented to person, place, and time. He appears well-developed and well-nourished. No distress.   HENT:   Head: Normocephalic and atraumatic.   Eyes: Conjunctivae and EOM are normal. Pupils are equal, round, and reactive to light. Right eye exhibits no discharge. Left eye exhibits no discharge. No scleral icterus.   Neck: Normal range of motion. Neck supple. No JVD present.   Cardiovascular: Normal rate, regular rhythm and normal heart sounds.    No murmur heard.  Pulmonary/Chest: Effort normal and breath sounds normal. No respiratory distress. He has no wheezes. He has no rales.   Musculoskeletal: He exhibits no edema.        Arms:  Lymphadenopathy:     He has  no cervical adenopathy.   Neurological: He is alert and oriented to person, place, and time.   Skin: Skin is warm and dry. No rash noted. He is not diaphoretic. No pallor.       Assessment:       1. Right forearm pain        Plan:    1. No signs of infection or fractures        Pt advised to start Aleve BID PRN

## 2017-08-19 LAB
GRAM STN SPEC: NORMAL

## 2017-08-21 LAB
BACTERIA SPEC AEROBE CULT: NORMAL
BACTERIA SPEC AEROBE CULT: NORMAL

## 2017-08-21 PROCEDURE — 11730 AVULSION NAIL PLATE SIMPLE 1: CPT | Mod: TA,S$GLB,, | Performed by: PODIATRIST

## 2017-08-22 ENCOUNTER — PATIENT MESSAGE (OUTPATIENT)
Dept: PODIATRY | Facility: CLINIC | Age: 67
End: 2017-08-22

## 2017-08-22 ENCOUNTER — TELEPHONE (OUTPATIENT)
Dept: PODIATRY | Facility: CLINIC | Age: 67
End: 2017-08-22

## 2017-08-22 DIAGNOSIS — L60.0 INGROWN NAIL: Primary | ICD-10-CM

## 2017-08-22 RX ORDER — CIPROFLOXACIN 500 MG/1
500 TABLET ORAL 2 TIMES DAILY
Qty: 20 TABLET | Refills: 0 | Status: SHIPPED | OUTPATIENT
Start: 2017-08-22 | End: 2017-09-01

## 2017-08-23 ENCOUNTER — TELEPHONE (OUTPATIENT)
Dept: PODIATRY | Facility: CLINIC | Age: 67
End: 2017-08-23

## 2017-08-23 NOTE — TELEPHONE ENCOUNTER
----- Message from Gemma Turcios sent at 8/23/2017  9:55 AM CDT -----  Contact: [email or web address]  Patient states he has questions about his antibiotic scrip.        I call patient back and left a message to call back about his script.

## 2017-08-24 ENCOUNTER — PATIENT MESSAGE (OUTPATIENT)
Dept: PODIATRY | Facility: CLINIC | Age: 67
End: 2017-08-24

## 2017-08-24 DIAGNOSIS — L60.0 INGROWN NAIL: Primary | ICD-10-CM

## 2017-08-24 DIAGNOSIS — L60.0 INGROWN NAIL: ICD-10-CM

## 2017-08-24 LAB — BACTERIA SPEC ANAEROBE CULT: NORMAL

## 2017-08-24 RX ORDER — CLINDAMYCIN HYDROCHLORIDE 300 MG/1
300 CAPSULE ORAL 3 TIMES DAILY
Qty: 21 CAPSULE | Refills: 0 | Status: SHIPPED | OUTPATIENT
Start: 2017-08-24 | End: 2017-08-24 | Stop reason: SDUPTHER

## 2017-08-24 RX ORDER — CLINDAMYCIN HYDROCHLORIDE 300 MG/1
300 CAPSULE ORAL 3 TIMES DAILY
Qty: 21 CAPSULE | Refills: 0 | Status: SHIPPED | OUTPATIENT
Start: 2017-08-24 | End: 2017-08-31

## 2017-09-01 ENCOUNTER — OFFICE VISIT (OUTPATIENT)
Dept: PODIATRY | Facility: CLINIC | Age: 67
End: 2017-09-01
Payer: MEDICARE

## 2017-09-01 VITALS
BODY MASS INDEX: 25.62 KG/M2 | DIASTOLIC BLOOD PRESSURE: 76 MMHG | HEIGHT: 69 IN | SYSTOLIC BLOOD PRESSURE: 144 MMHG | WEIGHT: 173 LBS | HEART RATE: 69 BPM

## 2017-09-01 DIAGNOSIS — L60.0 INGROWN NAIL: Primary | ICD-10-CM

## 2017-09-01 PROCEDURE — 99999 PR PBB SHADOW E&M-EST. PATIENT-LVL III: CPT | Mod: PBBFAC,,, | Performed by: PODIATRIST

## 2017-09-01 PROCEDURE — 1126F AMNT PAIN NOTED NONE PRSNT: CPT | Mod: S$GLB,,, | Performed by: PODIATRIST

## 2017-09-01 PROCEDURE — 3077F SYST BP >= 140 MM HG: CPT | Mod: S$GLB,,, | Performed by: PODIATRIST

## 2017-09-01 PROCEDURE — 1159F MED LIST DOCD IN RCRD: CPT | Mod: S$GLB,,, | Performed by: PODIATRIST

## 2017-09-01 PROCEDURE — 3078F DIAST BP <80 MM HG: CPT | Mod: S$GLB,,, | Performed by: PODIATRIST

## 2017-09-01 PROCEDURE — 3008F BODY MASS INDEX DOCD: CPT | Mod: S$GLB,,, | Performed by: PODIATRIST

## 2017-09-01 PROCEDURE — 99213 OFFICE O/P EST LOW 20 MIN: CPT | Mod: S$GLB,,, | Performed by: PODIATRIST

## 2017-09-01 NOTE — PATIENT INSTRUCTIONS
Understanding Ingrown Toenails    An ingrown nail is the result of a nail growing into the skin that surrounds it. This often occurs at either edge of the big toe. Ingrown nails may be caused by improper trimming, inherited nail deformities, injuries, fungal infections, or pressure.  Symptoms  Ingrown nails may cause pain at the tip of the toe or all the way to the base of the toe. The pain is often worse while walking. An ingrown nail may also lead to infection, inflammation, or a more serious condition. If its infected, you might see pus or redness.  Evaluation  To determine the extent of your problem, your healthcare provider examines and possibly presses the painful area. If other problems are suspected, blood tests, cultures, and X-rays may be done as well.  Treatment  If the nail isnt infected, your healthcare provider may trim the corner of it to help relieve your symptoms. He or she may need to remove one side of your nail back to the cuticle. The base of the nail may then be treated with a chemical to keep the ingrown part from growing back. Severe infections or ingrown nails may require antibiotics and temporary or permanent removal of a portion of the nail. To prevent pain, a local anesthetic may be used in these procedures. This treatment is usually done at your healthcare provider's office.  Prevention  Many nail problems can be prevented by wearing the right shoes and trimming your nails properly. To help avoid infection, keep your feet clean and dry. If you have diabetes, talk with your healthcare provider before doing any foot self-care.  · The right shoes: Get your feet measured (your size may change as you age). Wear shoes that are supportive and roomy enough for your toes to wiggle. Look for shoes made of natural materials such as leather, which allow your feet to breathe.  · Proper trimming: To avoid problems, trim your toenails straight across without cutting down into the corners. If you  cant trim your own nails, ask your healthcare provider to do so for you.  Date Last Reviewed: 10/1/2016  © 7558-0910 [employer]. [address] [place] [address], [place] [address]. All rights reserved. This information is not intended as a substitute for professional medical care. Always follow your healthcare professional's instructions.          Understanding Thickened Nails    There are several causes of very thick or crumbling nails. They can be caused by injuries or pressure from shoes. Fungal infections are a common cause. Diabetes, psoriasis, or vascular disease are other possible causes.  Symptoms  Along with thickening, the nail may appear ridged, brittle, or yellowish. It may also feel painful when pressure is put on it. After a while, a thickened nail may loosen and fall off.  Evaluation  Because thickened nails may be a symptom of a medical condition, your healthcare provider will look at your medical history. A test may be done to check for fungal infection. The thickness and color of the nail are examined carefully. This helps determine the cause.  Treatment  · For infection: Oral or topical antifungal medicines may be used to treat infection. These can help prevent sores under the nail. They also keep the fungus from spreading to other nails.  · For thick nails not caused by infection: Thinning the nail may be an option. This can be done by trimming, filing, or grinding.  · For pain: If the nail causes pain, part or all of the nail can be removed with surgery. Never try to remove a nail by yourself.  Prevention  Many nail problems can be prevented by wearing the right shoes and trimming your nails properly. Keep your feet clean and dry to help avoid infection. If you have diabetes, talk with your healthcare provider before doing any foot self-care.  · The right shoes: Get your feet measured. Your size may change as you age. This is due to ligaments that loosen with age and allow the bones in  your foot to change position or spread. Wear shoes that are supportive and roomy enough for your toes to wiggle. Look for shoes made of natural materials, such as leather, which allow your feet to breathe.  · Proper trimming: To avoid problems, trim your toenails straight across. Then file the edges with a nail file. If you cant trim your own nails, ask your healthcare provider to do so for you.  Date Last Reviewed: 10/1/2016  © 9224-8662 [employer]. [address] [place] [address]. All rights reserved. This information is not intended as a substitute for professional medical care. Always follow your healthcare professional's instructions.        What Is Psoriasis?  Psoriasis is a chronic skin disease. Psoriasis can begin at any age. It is most common between ages 30 and 39 and also between ages 50 and 69. Psoriasis affects nearly equal numbers of men and women. In people with this disease, the skin grows too fast. Dead skin cells build up on the skins surface to form inflamed, thick, silvery scales called plaques. Sometimes people form many small lesions that can hurt or have pus in them. Psoriasis does not spread from person to person.  About your symptoms  Psoriasis plaques tend to form on the elbows, knees, scalp, navel, arms, legs, and the penis or vulva. They can be unsightly, painful, and itchy. Plaques on the joints can limit movement. On the fingernails or toenails, psoriasis can cause pitting, a change in nail color, and a change in nail shape. In some cases, psoriasis also causes symptoms that are like arthritis. Symptoms may come and go on their own. Factors such as stress, infection, and certain medications may cause flare-ups. If symptoms bother you, know that many effective medical treatments exist  that can help relieve symptoms for most people with psoriasis. Discuss your treatment options with your health care provider.  External medical treatments  There are many types  of external medical treatments that are used to treat the affected skin lesions. Your health care provider may prescribe one of many types of topical medications. These include strong topical steroids or steroid-sparing agents. You apply them to your skin on a regular basis. Coal tar (a thick black liquid) may be applied to thick plaques. In some cases, the skin may be exposed to a special light in the health care provider's office. Or, you can expose the psoriatic plaques to short periods (5 minutes) of natural sun as directed by your health care provider. This method is called phototherapy. It can be enhanced with the use of psoralen (a type of medication).  Internal medical treatments  Internal treatments are taken orally (by mouth) or given by injection. There are a number of oral medications for more severe psoriasis. Your health care provider can tell you more about these treatments.  Date Last Reviewed: 5/10/2015  © 8565-5035 [employer]. [address] [place] [address]. All rights reserved. This information is not intended as a substitute for professional medical care. Always follow your healthcare professional's instructions.        Psoriasis  Psoriasis is an inflammatory condition that affects the skin and nails. You may have patches of thick, red skin (plaques) covered with silvery scales. These often appear on the elbows, knees, legs, lower back, and scalp.  The plaques itch and can be painful. People with this condition are more likely to have emotional stress and depression.  Psoriasis is not contagious. It cant spread to someone else who touches it. But it can be inherited. It is an autoimmune skin disease. This means that the immune system has an abnormal reaction. It treats healthy skin like it is a foreign substance. This causes skin cells to grow faster than normal and to stack up in raised red patches. Psoriasis is a long-term (chronic) disease. You will have flare-ups  that come and go over time.  Smoking, sun exposure, and alcohol use may affect how often the psoriasis occurs and how long the flare-ups last.  There is no cure, but treatments can offer relief. Treatment can include topical creams, light therapy (phototherapy), and oral or injectable medicines.  Home care  · No specific diet is needed. Eat a healthy, well-balanced diet that includes fresh fruits and vegetables, whole grains, and lean meats. Psoriasis can increase your risk for diabetes and heart disease.  · Increasing omega-3 fatty acids in your diet can help improve dry skin. The best dietary sources are fatty fish (salmon, mackerel, lake trout, albacore tuna) or fish oil (such as cod liver oil). A great way to take fish oil is to add it to a juice, shake, or smoothie. Flaxseeds and flaxseed oil, canola oil, walnuts, soybean, and tofu are converted to omega-3 fatty acid in the body.  · Stay at a healthy weight. Overlapping skin folds can be a site for psoriasis plaques. If you are overweight, talk to your healthcare provider about a weight-loss program.  · Bathing daily can help remove scales and calm inflamed skin. Use lukewarm water and mild soaps that have added oils, fats, and moisturizers. Avoid deodorants, antiperspirants, and antibacterial soaps. These have a drying effect. Many people find it helpful to soak in a tub with added bath oils, oatmeal, apple cider vinegar, or Epsom salts.  · After bathing, put on skin cream (or a skin oil for a stronger effect).  · Some exposure to UV rays from the sun can improve psoriasis. But too much sun can trigger an outbreak. It also raises your risk for skin cancer. Limit sun exposure and use sunscreen on healthy skin (at least 15 SPF).  · If you are prescribed medicine, take it as directed.  · Unless another steroid cream was prescribed, you may use over-the-counter hydrocortisone cream for a few weeks during symptom flare-ups.  · Stop smoking. If you are a long-time  smoker, this can be hard. Think about joining a stop-smoking program.  · Tell your provider if your joints start to ache or get stiff.  · Tell your provider if you notice changes in your fingernails.  · Depression is more common among psoriasis patients. Get help if you notice changes in your mood.  Follow-up care  Follow up with your healthcare provider, or as advised.  When to seek medical advice  Call your healthcare provider right away if any of these occur:  · Skin pain gets worse  · Bleeding from the skin plaques that is hard to control  · Signs of skin infection (redness, increasing pain, swelling, pus)  · Fever of 1 degree, or higher, above your normal temperature, or as directed by your provider  Date Last Reviewed: 8/1/2016 © 2000-2016 [employer]. [address] [place] [address], [place] [address]. All rights reserved. This information is not intended as a substitute for professional medical care. Always follow your healthcare professional's instructions.        Managing Psoriasis     Take baths in warm water to help soften scales.   The success of your medical treatment depends on you. When your healthcare provider gives you a treatment plan, ask when you should expect to see results. Then, follow your plan. If your treatment does not work in the expected time, let your healthcare provider know. Psoriasis is a common disease, and it can respond to many different treatments. It depends on the location, size, and symptoms each person experiences. Some treatments are simple (tar-based therapies or topical steroids), and some are complex (new biologic medications or light therapy). Your healthcare provider will need to personalize your treatment. Psoriasis frequently will get better with treatment, but later worsen if you stop treatment or if a new illness ocurs. In most cases, you can get control of your psoriasis again. You will likely need to see your healthcare provider regularly about treatment  options.  Psoriasis self-care  Follow these steps to help manage your symptoms:  · Take baths to help soften scales. Use warm, not hot, water. To avoid drying out your skin, limit each bath to about 15 minutes. Add bath oil or [place] salts.  · After you bathe, apply lotion right away, while your skin is damp. Dry skin can make symptoms worse.  · Use a scalp treatment as prescribed by your health care provider. There are different solutions and dosages based on your symptoms.   · Promptly seek treatment for any skin injuries because they can cause flare-ups.  · Manage your stress, and use relaxation techniques.  · Expose your psoriatic skin to sunlight for 5 minutes a day, except if you feel that sun exposure makes your psoriasis worse. Use sunscreen on the normal, unaffected skin, but avoid sunburns.  · Use over-the-counter hydrocortisone cream for itching to reduce scaling for active outbreaks. Consult with your healthcare provider for long-term use.  · Stick with treatment that your healthcare provider has recommended for you, especially if it's controlling your psoriasis.  · Avoid abrasive cleansers, harsh detergents, and household chemicals.  Getting good results  Now that you know more about psoriasis, the next step is up to you. Follow your healthcare provider's treatment plan and self-care routine. Doing so can help you control your symptoms. If your symptoms dont improve or they get worse, call your healthcare provider. Psoriasis cant be cured. But its symptoms can be managed.   Date Last Reviewed: 5/10/2015  © 8312-7740 [employer]. [address] [place] [address], [place] [address]. All rights reserved. This information is not intended as a substitute for professional medical care. Always follow your healthcare professional's instructions.

## 2017-09-01 NOTE — PROGRESS NOTES
Subjective:    Patient ID: Joseph Rizvi Jr. is a 67 y.o. male.    Chief Complaint: Follow-up (left great toenail/ dr dorantes 8/18/17)      HPI:   Joseph is a 67 y.o. male who presents to the clinic complaining of painful ingrown toenail on the left foot.    67 M 2 wks s/p TNA of ingowing infected hallux nail. On clinda and cipro for cxs + for psuedomnas, proteus, and prevotella.        Last Podiatry Enc: Visit date not found  Last Enc w/ Me: Visit date not found    Past Medical History:   Diagnosis Date    Bell's palsy     Depression     Hypertension     Psoriasis      Past Surgical History:   Procedure Laterality Date    COLONOSCOPY N/A 10/14/2015    Procedure: COLONOSCOPY;  Surgeon: CYNTHIA Potter MD;  Location: [address];  Service: Endoscopy;  Laterality: N/A;    Lipoma removal      groin area      Social History     Social History    Marital status: Single     Spouse name: N/A    Number of children: N/A    Years of education: N/A     Occupational History          Social History Main Topics    Smoking status: Never Smoker    Smokeless tobacco: Never Used    Alcohol use Yes      Comment: Social     Drug use: No    Sexual activity: Not on file     Other Topics Concern    Not on file     Social History Narrative    No narrative on file         Current Outpatient Prescriptions:     ciprofloxacin HCl (CIPRO) 500 MG tablet, Take 1 tablet (500 mg total) by mouth 2 (two) times daily., Disp: 20 tablet, Rfl: 0    cyanocobalamin (VITAMIN B-12) 1000 MCG tablet, Take 100 mcg by mouth once daily., Disp: , Rfl:     irbesartan (AVAPRO) 150 MG tablet, Take 1 tablet (150 mg total) by mouth every evening., Disp: 90 tablet, Rfl: 3    vitamin E 200 UNIT capsule, Take 200 Units by mouth once daily., Disp: , Rfl:    Review of patient's allergies indicates:  No Known Allergies    ROS  ROS Constitutional:  General Appearance: well nourished  Vascular: negative for cramps, edema  "and bruising  Musculoskeletal: negative for joint paint and joint edema  Skin: negative for rashes and lesions  Neurological: negative for burning, tingling and numbness  Gastrointestinal: negative for stomach pain, nausea and vomiting        Objective:        BP (!) 144/76   Pulse 69   Ht 5' 9" (1.753 m)   Wt 78.5 kg (173 lb)   BMI 25.55 kg/m²     Ortho/SPM Exam  Physical Exam  LE exam con't:  V: DP 2/4, PT 2/4, CRT< 3s to all digits tested.     N: SILT in SP/DP/T/Briseida/Saph distributions.    Derm: Skin intact. No erythema, edema or ecchymosis. L hallux nail px site healing well. No signs of infection    Ortho:  +Motor EHL/FHL/TA/GA   Compartments soft/compressible. No pain on passive stretch of big toe. No calf  pain.        Assessment:     Imaging / Labs:    Aerobic Culture   Order: [id number]   Status:  Final result   Visible to patient:  Yes (Patient Portal) Next appt:  None Dx:  Ingrown nail - Left Foot    2wk ago   Aerobic Bacterial Culture  PSEUDOMONAS AERUGINOSA   Many    Aerobic Bacterial Culture  PROTEUS MIRABILIS   Unable to quantitate    Resulting Agency [facility]   Susceptibility      Pseudomonas aeruginosa Proteus mirabilis     CULTURE, AEROBIC  (SPECIFY SOURCE) CULTURE, AEROBIC  (SPECIFY SOURCE)     Amikacin <=16 "><=16  Sensitive       Amox/K Clav'ate   <=8/4 "><=8/4  Sensitive     Amp/Sulbactam   <=8/4 "><=8/4  Sensitive     Ampicillin   <=8 "><=8  Sensitive     Cefazolin   <=8 "><=8  Sensitive     Cefepime <=8 "><=8  Sensitive <=8 "><=8  Sensitive     Ceftriaxone   <=8 "><=8  Sensitive     Ciprofloxacin <=1 "><=1  Sensitive <=1 "><=1  Sensitive     Ertapenem   <=2 "><=2  Sensitive     Gentamicin <=4 "><=4  Sensitive <=4 "><=4  Sensitive     Meropenem <=4 "><=4  Sensitive <=4 "><=4  Sensitive     Piperacillin/Tazo <=16 "><=16  Sensitive <=16 "><=16  Sensitive     Tobramycin <=4 "><=4  Sensitive <=4 "><=4  Sensitive     Trimeth/Sulfa   <=2/38 "><=2/38  Sensitive                Specimen Collected: " 08/18/17 15:47 Last Resulted: 08/21/17 09:10                Culture, Anaerobe   Order: [id number]   Status:  Final result   Visible to patient:  Yes (Patient Portal) Next appt:  None Dx:  Ingrown nail - Left Foot    2wk ago   Anaerobic Culture  PREVOTELLA (B.) DISIENS   Moderate    Resulting Agency [facility]      Specimen Collected: 08/18/17 15:47 Last Resulted: 08/24/17 11:05                      1. Ingrown nail          Plan:          Conrad Ruiz was seen today for follow-up.    Diagnoses and all orders for this visit:    Ingrown nail        Joseph Rizvi Jr. is a 67 y.o. male presenting w/   1. Ingrown nail        -pt seen, evaluated, and managed  -dx discussed in detail. All questions/concerns addressed  -all tx options discussed. All alternatives, risks, benefits of all txs discussed  -The patient was educated regarding the above diagnosis. We discussed conservative care options regarding shoe wear and/or padding.  -rxs dispensed: none  -px site healing well  -problem resolved for now  -discussed prevention    rtc prn    Return if symptoms worsen or fail to improve.

## 2017-09-06 ENCOUNTER — OFFICE VISIT (OUTPATIENT)
Dept: URGENT CARE | Facility: CLINIC | Age: 67
End: 2017-09-06
Payer: MEDICARE

## 2017-09-06 VITALS
WEIGHT: 170 LBS | TEMPERATURE: 97 F | OXYGEN SATURATION: 98 % | HEART RATE: 73 BPM | HEIGHT: 69 IN | DIASTOLIC BLOOD PRESSURE: 84 MMHG | RESPIRATION RATE: 16 BRPM | BODY MASS INDEX: 25.18 KG/M2 | SYSTOLIC BLOOD PRESSURE: 162 MMHG

## 2017-09-06 DIAGNOSIS — L03.113 CELLULITIS OF RIGHT ARM: Primary | ICD-10-CM

## 2017-09-06 PROCEDURE — 3008F BODY MASS INDEX DOCD: CPT | Mod: S$GLB,,, | Performed by: INTERNAL MEDICINE

## 2017-09-06 PROCEDURE — 1126F AMNT PAIN NOTED NONE PRSNT: CPT | Mod: S$GLB,,, | Performed by: INTERNAL MEDICINE

## 2017-09-06 PROCEDURE — 1159F MED LIST DOCD IN RCRD: CPT | Mod: S$GLB,,, | Performed by: INTERNAL MEDICINE

## 2017-09-06 PROCEDURE — 3077F SYST BP >= 140 MM HG: CPT | Mod: S$GLB,,, | Performed by: INTERNAL MEDICINE

## 2017-09-06 PROCEDURE — 99213 OFFICE O/P EST LOW 20 MIN: CPT | Mod: S$GLB,,, | Performed by: INTERNAL MEDICINE

## 2017-09-06 PROCEDURE — 3079F DIAST BP 80-89 MM HG: CPT | Mod: S$GLB,,, | Performed by: INTERNAL MEDICINE

## 2017-09-06 RX ORDER — DOXYCYCLINE 100 MG/1
100 CAPSULE ORAL 2 TIMES DAILY
Qty: 14 CAPSULE | Refills: 0 | Status: SHIPPED | OUTPATIENT
Start: 2017-09-06 | End: 2017-09-13

## 2017-09-07 NOTE — PROGRESS NOTES
"Subjective:       Patient ID: Joseph Rizvi Jr. is a 67 y.o. male.    Vitals:  height is 5' 9" (1.753 m) and weight is 77.1 kg (170 lb). His temperature is 97.1 °F (36.2 °C). His blood pressure is 162/84 (abnormal) and his pulse is 73. His respiration is 16 and oxygen saturation is 98%.     Chief Complaint: Arm Pain    Patient states a tree branch hit his arm two weeks ago and it fell directly on to his arm.       Arm Pain    The incident occurred more than 1 week ago. The incident occurred at home. The injury mechanism was a direct blow. The pain is present in the upper right arm. The quality of the pain is described as aching. The pain does not radiate. The pain is at a severity of 1/10. The pain is mild. The pain has been fluctuating since the incident. Associated symptoms include tingling. Pertinent negatives include no chest pain. The symptoms are aggravated by movement. He has tried NSAIDs for the symptoms. The treatment provided moderate relief.     Review of Systems   Constitution: Negative for chills and fever.   HENT: Negative for sore throat.    Eyes: Positive for pain. Negative for blurred vision.   Cardiovascular: Negative for chest pain.   Respiratory: Negative for shortness of breath.    Skin: Negative for rash.   Musculoskeletal: Positive for stiffness. Negative for back pain and joint pain.   Gastrointestinal: Negative for abdominal pain, diarrhea, nausea and vomiting.   Neurological: Positive for tingling. Negative for headaches.   Psychiatric/Behavioral: The patient is not nervous/anxious.        Objective:      Physical Exam   Musculoskeletal:   Swelling,erythema and warmth R forearm       Assessment:       1. Cellulitis of right arm        Plan:         Cellulitis of right arm  -     doxycycline (VIBRAMYCIN) 100 MG Cap; Take 1 capsule (100 mg total) by mouth 2 (two) times daily.  Dispense: 14 capsule; Refill: 0          "

## 2017-09-19 LAB — FUNGUS SPEC CULT: NORMAL

## 2017-09-27 ENCOUNTER — TELEPHONE (OUTPATIENT)
Dept: INTERNAL MEDICINE | Facility: CLINIC | Age: 67
End: 2017-09-27

## 2017-09-27 DIAGNOSIS — I10 ESSENTIAL HYPERTENSION: ICD-10-CM

## 2017-09-27 DIAGNOSIS — I87.2 VENOUS INSUFFICIENCY: ICD-10-CM

## 2017-09-27 DIAGNOSIS — Z00.00 WELL ADULT EXAM: Primary | ICD-10-CM

## 2017-09-27 DIAGNOSIS — Z12.5 PROSTATE CANCER SCREENING: ICD-10-CM

## 2017-09-27 DIAGNOSIS — L40.9 PSORIASIS: ICD-10-CM

## 2017-09-27 DIAGNOSIS — I45.10 RBBB: ICD-10-CM

## 2017-09-27 NOTE — TELEPHONE ENCOUNTER
----- Message from Josefina Mojica sent at 9/27/2017  1:59 PM CDT -----  Doctor appointment and lab have been scheduled.  Please link lab orders to the lab appointment.  Date of doctor appointment:  1/24  Physical or EP:  physical  Date of lab appointment:  1/17  Comments:

## 2017-10-21 LAB
ACID FAST MOD KINY STN SPEC: NORMAL
MYCOBACTERIUM SPEC QL CULT: NORMAL

## 2018-01-18 ENCOUNTER — LAB VISIT (OUTPATIENT)
Dept: LAB | Facility: HOSPITAL | Age: 68
End: 2018-01-18
Attending: FAMILY MEDICINE
Payer: MEDICARE

## 2018-01-18 DIAGNOSIS — I10 ESSENTIAL HYPERTENSION: ICD-10-CM

## 2018-01-18 DIAGNOSIS — Z00.00 WELL ADULT EXAM: ICD-10-CM

## 2018-01-18 DIAGNOSIS — I45.10 RBBB: ICD-10-CM

## 2018-01-18 DIAGNOSIS — Z12.5 PROSTATE CANCER SCREENING: ICD-10-CM

## 2018-01-18 DIAGNOSIS — L40.9 PSORIASIS: ICD-10-CM

## 2018-01-18 DIAGNOSIS — I87.2 VENOUS INSUFFICIENCY: ICD-10-CM

## 2018-01-18 LAB
BASOPHILS # BLD AUTO: 0.05 K/UL
BASOPHILS NFR BLD: 0.6 %
DIFFERENTIAL METHOD: ABNORMAL
EOSINOPHIL # BLD AUTO: 0.1 K/UL
EOSINOPHIL NFR BLD: 0.6 %
ERYTHROCYTE [DISTWIDTH] IN BLOOD BY AUTOMATED COUNT: 12.5 %
HCT VFR BLD AUTO: 43.6 %
HGB BLD-MCNC: 14.8 G/DL
IMM GRANULOCYTES # BLD AUTO: 0.02 K/UL
IMM GRANULOCYTES NFR BLD AUTO: 0.2 %
LYMPHOCYTES # BLD AUTO: 1.7 K/UL
LYMPHOCYTES NFR BLD: 20.4 %
MCH RBC QN AUTO: 32.4 PG
MCHC RBC AUTO-ENTMCNC: 33.9 G/DL
MCV RBC AUTO: 95 FL
MONOCYTES # BLD AUTO: 0.6 K/UL
MONOCYTES NFR BLD: 7.8 %
NEUTROPHILS # BLD AUTO: 5.7 K/UL
NEUTROPHILS NFR BLD: 70.4 %
NRBC BLD-RTO: 0 /100 WBC
PLATELET # BLD AUTO: 263 K/UL
PMV BLD AUTO: 9.7 FL
RBC # BLD AUTO: 4.57 M/UL
WBC # BLD AUTO: 8.17 K/UL

## 2018-01-18 PROCEDURE — 36415 COLL VENOUS BLD VENIPUNCTURE: CPT | Mod: PO

## 2018-01-18 PROCEDURE — 85025 COMPLETE CBC W/AUTO DIFF WBC: CPT

## 2018-01-18 PROCEDURE — 80053 COMPREHEN METABOLIC PANEL: CPT

## 2018-01-18 PROCEDURE — 80061 LIPID PANEL: CPT

## 2018-01-18 PROCEDURE — 84443 ASSAY THYROID STIM HORMONE: CPT

## 2018-01-18 PROCEDURE — 82306 VITAMIN D 25 HYDROXY: CPT

## 2018-01-18 PROCEDURE — 84439 ASSAY OF FREE THYROXINE: CPT

## 2018-01-18 PROCEDURE — 84153 ASSAY OF PSA TOTAL: CPT

## 2018-01-19 ENCOUNTER — PATIENT MESSAGE (OUTPATIENT)
Dept: INTERNAL MEDICINE | Facility: CLINIC | Age: 68
End: 2018-01-19

## 2018-01-19 DIAGNOSIS — E55.9 VITAMIN D DEFICIENCY: ICD-10-CM

## 2018-01-19 LAB
25(OH)D3+25(OH)D2 SERPL-MCNC: 19 NG/ML
ALBUMIN SERPL BCP-MCNC: 3.7 G/DL
ALP SERPL-CCNC: 77 U/L
ALT SERPL W/O P-5'-P-CCNC: 13 U/L
ANION GAP SERPL CALC-SCNC: 7 MMOL/L
AST SERPL-CCNC: 17 U/L
BILIRUB SERPL-MCNC: 1 MG/DL
BUN SERPL-MCNC: 14 MG/DL
CALCIUM SERPL-MCNC: 9.2 MG/DL
CHLORIDE SERPL-SCNC: 105 MMOL/L
CHOLEST SERPL-MCNC: 143 MG/DL
CHOLEST/HDLC SERPL: 3 {RATIO}
CO2 SERPL-SCNC: 28 MMOL/L
COMPLEXED PSA SERPL-MCNC: 1.4 NG/ML
CREAT SERPL-MCNC: 0.8 MG/DL
EST. GFR  (AFRICAN AMERICAN): >60 ML/MIN/1.73 M^2
EST. GFR  (NON AFRICAN AMERICAN): >60 ML/MIN/1.73 M^2
GLUCOSE SERPL-MCNC: 81 MG/DL
HDLC SERPL-MCNC: 47 MG/DL
HDLC SERPL: 32.9 %
LDLC SERPL CALC-MCNC: 85 MG/DL
NONHDLC SERPL-MCNC: 96 MG/DL
POTASSIUM SERPL-SCNC: 4.6 MMOL/L
PROT SERPL-MCNC: 7 G/DL
SODIUM SERPL-SCNC: 140 MMOL/L
T4 FREE SERPL-MCNC: 1.09 NG/DL
TRIGL SERPL-MCNC: 55 MG/DL
TSH SERPL DL<=0.005 MIU/L-ACNC: 1.37 UIU/ML

## 2018-01-19 RX ORDER — ERGOCALCIFEROL 1.25 MG/1
50000 CAPSULE ORAL
Qty: 4 CAPSULE | Refills: 2 | Status: SHIPPED | OUTPATIENT
Start: 2018-01-19 | End: 2018-01-24 | Stop reason: SDUPTHER

## 2018-01-24 ENCOUNTER — OFFICE VISIT (OUTPATIENT)
Dept: INTERNAL MEDICINE | Facility: CLINIC | Age: 68
End: 2018-01-24
Payer: MEDICARE

## 2018-01-24 VITALS
WEIGHT: 175.5 LBS | RESPIRATION RATE: 15 BRPM | TEMPERATURE: 98 F | DIASTOLIC BLOOD PRESSURE: 76 MMHG | SYSTOLIC BLOOD PRESSURE: 132 MMHG | HEART RATE: 66 BPM | BODY MASS INDEX: 25.91 KG/M2

## 2018-01-24 DIAGNOSIS — E55.9 VITAMIN D DEFICIENCY: ICD-10-CM

## 2018-01-24 DIAGNOSIS — I10 ESSENTIAL HYPERTENSION: ICD-10-CM

## 2018-01-24 DIAGNOSIS — I45.10 RBBB: ICD-10-CM

## 2018-01-24 DIAGNOSIS — Z00.00 WELL ADULT EXAM: Primary | ICD-10-CM

## 2018-01-24 DIAGNOSIS — Z80.8 FAMILY HISTORY OF MELANOMA: ICD-10-CM

## 2018-01-24 DIAGNOSIS — L40.9 PSORIASIS: ICD-10-CM

## 2018-01-24 PROCEDURE — 99999 PR PBB SHADOW E&M-EST. PATIENT-LVL III: CPT | Mod: PBBFAC,,, | Performed by: FAMILY MEDICINE

## 2018-01-24 PROCEDURE — 99397 PER PM REEVAL EST PAT 65+ YR: CPT | Mod: S$GLB,,, | Performed by: FAMILY MEDICINE

## 2018-01-24 RX ORDER — ERGOCALCIFEROL 1.25 MG/1
50000 CAPSULE ORAL
Qty: 4 CAPSULE | Refills: 2 | Status: SHIPPED | OUTPATIENT
Start: 2018-01-24 | End: 2018-04-15 | Stop reason: SDUPTHER

## 2018-01-24 RX ORDER — IRBESARTAN 150 MG/1
150 TABLET ORAL NIGHTLY
Qty: 90 TABLET | Refills: 3 | Status: SHIPPED | OUTPATIENT
Start: 2018-01-24 | End: 2019-01-25 | Stop reason: SDUPTHER

## 2018-01-24 NOTE — PROGRESS NOTES
Subjective:       Patient ID: Joseph Rizvi Jr. is a 68 y.o. male.    Chief Complaint: Annual Exam    HPI 68-year-old white male presents to clinic today for annual physical exam.  He continues to have well-controlled hypertension on Avapro 150 mg daily.  He has seen cardiology in the past for evaluation of right bundle branch block which remains stable.  He has a past surgical history of lipoma removal.  He has a family history of schizophrenia.  His mother passed away from overdose.  His father passed away from dementia and heart disease.  He also reports a family history of melanoma in both sisters.  She is up-to-date with pneumonia vaccinations.  Flu vaccine has been discussed and has been declined today.  Review of Systems   Constitutional: Negative for appetite change, chills, fatigue and fever.   HENT: Negative for congestion, ear pain, hearing loss, postnasal drip, rhinorrhea, sinus pressure, sore throat and tinnitus.    Eyes: Negative for redness, itching and visual disturbance.   Respiratory: Negative for cough, chest tightness and shortness of breath.    Cardiovascular: Negative for chest pain and palpitations.   Gastrointestinal: Negative for abdominal pain, constipation, diarrhea, nausea and vomiting.   Genitourinary: Negative for decreased urine volume, difficulty urinating, dysuria, frequency, hematuria and urgency.   Musculoskeletal: Negative for back pain, myalgias, neck pain and neck stiffness.   Skin: Negative for rash.   Neurological: Negative for dizziness, light-headedness and headaches.   Psychiatric/Behavioral: Negative.        Objective:      Physical Exam   Constitutional: He is oriented to person, place, and time. He appears well-developed and well-nourished. No distress.   HENT:   Head: Normocephalic and atraumatic.   Right Ear: External ear normal.   Left Ear: External ear normal.   Nose: Nose normal.   Mouth/Throat: Oropharynx is clear and moist. No oropharyngeal exudate.   Eyes:  Conjunctivae and EOM are normal. Pupils are equal, round, and reactive to light. Right eye exhibits no discharge. Left eye exhibits no discharge. No scleral icterus.   Neck: Normal range of motion. Neck supple. No JVD present. No tracheal deviation present. No thyromegaly present.   Cardiovascular: Normal rate, regular rhythm, normal heart sounds and intact distal pulses.  Exam reveals no gallop and no friction rub.    No murmur heard.  Pulmonary/Chest: Effort normal and breath sounds normal. No stridor. No respiratory distress. He has no wheezes. He has no rales.   Abdominal: Soft. Bowel sounds are normal. He exhibits no distension and no mass. There is no tenderness. There is no rebound and no guarding.   Musculoskeletal: Normal range of motion. He exhibits no edema or tenderness.   Lymphadenopathy:     He has no cervical adenopathy.   Neurological: He is alert and oriented to person, place, and time.   Skin: Skin is warm and dry. No rash noted. He is not diaphoretic. No erythema. No pallor.   Psychiatric: He has a normal mood and affect. His behavior is normal. Judgment and thought content normal.   Nursing note and vitals reviewed.      Assessment:       1. Well adult exam    2. Essential hypertension    3. RBBB    4. Psoriasis    5. Vitamin D deficiency    6. Family history of melanoma        Plan:       1.  Labs have been reviewed and are overall within normal limits except for a low vitamin D.  2.  Continue Avapro 150 mg daily.  Hypertension and right bundle branch block are stable.  3.  Refer to dermatology for further evaluation and treatment of psoriasis and further skin check for family history of melanoma.  4.  Vitamin D 50,000 units once weekly.  Repeat vitamin D level in 3 months.  5.  Return to clinic as needed or in one year for annual exam.

## 2018-04-15 DIAGNOSIS — E55.9 VITAMIN D DEFICIENCY: ICD-10-CM

## 2018-04-15 RX ORDER — ERGOCALCIFEROL 1.25 MG/1
50000 CAPSULE ORAL
Qty: 4 CAPSULE | Refills: 2 | Status: SHIPPED | OUTPATIENT
Start: 2018-04-15 | End: 2018-05-15

## 2018-04-19 ENCOUNTER — LAB VISIT (OUTPATIENT)
Dept: LAB | Facility: HOSPITAL | Age: 68
End: 2018-04-19
Attending: FAMILY MEDICINE
Payer: MEDICARE

## 2018-04-19 DIAGNOSIS — E55.9 VITAMIN D DEFICIENCY: ICD-10-CM

## 2018-04-19 LAB — 25(OH)D3+25(OH)D2 SERPL-MCNC: 29 NG/ML

## 2018-04-19 PROCEDURE — 36415 COLL VENOUS BLD VENIPUNCTURE: CPT | Mod: PO

## 2018-04-19 PROCEDURE — 82306 VITAMIN D 25 HYDROXY: CPT

## 2018-06-01 ENCOUNTER — OFFICE VISIT (OUTPATIENT)
Dept: INTERNAL MEDICINE | Facility: CLINIC | Age: 68
End: 2018-06-01
Payer: MEDICARE

## 2018-06-01 VITALS
HEART RATE: 73 BPM | WEIGHT: 168.44 LBS | DIASTOLIC BLOOD PRESSURE: 62 MMHG | HEIGHT: 69 IN | BODY MASS INDEX: 24.95 KG/M2 | RESPIRATION RATE: 18 BRPM | TEMPERATURE: 98 F | SYSTOLIC BLOOD PRESSURE: 130 MMHG | OXYGEN SATURATION: 98 %

## 2018-06-01 DIAGNOSIS — I10 ESSENTIAL HYPERTENSION: Primary | ICD-10-CM

## 2018-06-01 DIAGNOSIS — I45.10 RBBB: ICD-10-CM

## 2018-06-01 DIAGNOSIS — L91.8 SKIN TAG: ICD-10-CM

## 2018-06-01 DIAGNOSIS — E55.9 VITAMIN D DEFICIENCY: ICD-10-CM

## 2018-06-01 PROCEDURE — 11200 RMVL SKIN TAGS UP TO&INC 15: CPT | Mod: S$GLB,,, | Performed by: FAMILY MEDICINE

## 2018-06-01 PROCEDURE — 99999 PR PBB SHADOW E&M-EST. PATIENT-LVL III: CPT | Mod: PBBFAC,,, | Performed by: FAMILY MEDICINE

## 2018-06-01 PROCEDURE — 3075F SYST BP GE 130 - 139MM HG: CPT | Mod: CPTII,S$GLB,, | Performed by: FAMILY MEDICINE

## 2018-06-01 PROCEDURE — 99214 OFFICE O/P EST MOD 30 MIN: CPT | Mod: 25,S$GLB,, | Performed by: FAMILY MEDICINE

## 2018-06-01 PROCEDURE — 3078F DIAST BP <80 MM HG: CPT | Mod: CPTII,S$GLB,, | Performed by: FAMILY MEDICINE

## 2018-06-01 NOTE — PROGRESS NOTES
Subjective:       Patient ID: Joseph Rizvi Jr. is a 68 y.o. male.    Chief Complaint: Follow-up    HPI 68-year-old white male presents to clinic today for follow-up of his general medical conditions.  He continues to have well-controlled hypertension on Avapro 150 mg daily.  He has seen Cardiology in the past for evaluation of a right bundle branch block which now remained stable.  At this time he reports some mild left index finger pain secondary to frequent use of his hands at work.  He has been doing physical therapy with improvement of symptoms.  Secondarily he is also concerned of a skin tag to the front of his neck that has been present for many years.  He reports frequent irritation from his shirt collar rubbing on the skin tag.  Review of Systems   Constitutional: Negative for appetite change, chills, fatigue and fever.   HENT: Negative for congestion, ear pain, hearing loss, postnasal drip, rhinorrhea, sinus pressure, sore throat and tinnitus.    Eyes: Negative for redness, itching and visual disturbance.   Respiratory: Negative for cough, chest tightness and shortness of breath.    Cardiovascular: Negative for chest pain and palpitations.   Gastrointestinal: Negative for abdominal pain, constipation, diarrhea, nausea and vomiting.   Genitourinary: Negative for decreased urine volume, difficulty urinating, dysuria, frequency, hematuria and urgency.   Musculoskeletal: Positive for arthralgias (left index finger). Negative for back pain, myalgias, neck pain and neck stiffness.   Skin: Negative for rash.        Skin tag neck     Neurological: Negative for dizziness, light-headedness and headaches.   Psychiatric/Behavioral: Negative.        Objective:      Physical Exam   Constitutional: He is oriented to person, place, and time. He appears well-developed and well-nourished. No distress.   HENT:   Head: Normocephalic and atraumatic.   Right Ear: External ear normal.   Left Ear: External ear normal.   Nose:  Nose normal.   Mouth/Throat: Oropharynx is clear and moist. No oropharyngeal exudate.   Eyes: Conjunctivae and EOM are normal. Pupils are equal, round, and reactive to light. Right eye exhibits no discharge. Left eye exhibits no discharge. No scleral icterus.   Neck: Normal range of motion. Neck supple. No JVD present. No tracheal deviation present. No thyromegaly present.       Cardiovascular: Normal rate, regular rhythm, normal heart sounds and intact distal pulses.  Exam reveals no gallop and no friction rub.    No murmur heard.  Pulmonary/Chest: Effort normal and breath sounds normal. No stridor. No respiratory distress. He has no wheezes. He has no rales.   Abdominal: Soft. Bowel sounds are normal. He exhibits no distension and no mass. There is no tenderness. There is no rebound and no guarding.   Musculoskeletal: Normal range of motion. He exhibits no edema or tenderness.   Lymphadenopathy:     He has no cervical adenopathy.   Neurological: He is alert and oriented to person, place, and time.   Skin: Skin is warm and dry. No rash noted. He is not diaphoretic. No erythema. No pallor.   Psychiatric: He has a normal mood and affect. His behavior is normal. Judgment and thought content normal.   Nursing note and vitals reviewed.      Assessment:       1. Essential hypertension    2. RBBB    3. Vitamin D deficiency    4. Skin tag        Plan:       1.  Continue Avapro 150 mg daily.  Hypertension right bundle branch block were stable.  2.  Continue vitamin-D 2000 units daily.  Vitamin-D deficiency is stable.  3.  Treatment options for skin tag removal have been discussed in the patient wishes to proceed with liquid nitrogen cryotherapy.  Liquid nitrogen has been used in a spray fashion to remove 1 skin tag the anterior neck.  Patient tolerated the procedure well.  4.  Return to clinic as needed or in 6 months for annual exam.

## 2018-07-31 ENCOUNTER — OFFICE VISIT (OUTPATIENT)
Dept: URGENT CARE | Facility: CLINIC | Age: 68
End: 2018-07-31
Payer: MEDICARE

## 2018-07-31 VITALS
DIASTOLIC BLOOD PRESSURE: 67 MMHG | TEMPERATURE: 97 F | RESPIRATION RATE: 19 BRPM | OXYGEN SATURATION: 97 % | SYSTOLIC BLOOD PRESSURE: 116 MMHG | BODY MASS INDEX: 24.88 KG/M2 | HEIGHT: 69 IN | HEART RATE: 60 BPM | WEIGHT: 168 LBS

## 2018-07-31 DIAGNOSIS — H61.22 IMPACTED CERUMEN OF LEFT EAR: Primary | ICD-10-CM

## 2018-07-31 PROCEDURE — 3074F SYST BP LT 130 MM HG: CPT | Mod: CPTII,S$GLB,, | Performed by: NURSE PRACTITIONER

## 2018-07-31 PROCEDURE — 3078F DIAST BP <80 MM HG: CPT | Mod: CPTII,S$GLB,, | Performed by: NURSE PRACTITIONER

## 2018-07-31 PROCEDURE — 99214 OFFICE O/P EST MOD 30 MIN: CPT | Mod: S$GLB,,, | Performed by: NURSE PRACTITIONER

## 2018-07-31 NOTE — PROGRESS NOTES
"Subjective:       Patient ID: Joseph Rizvi Jr. is a 68 y.o. male.    Vitals:  height is 5' 9" (1.753 m) and weight is 76.2 kg (168 lb). His oral temperature is 97.4 °F (36.3 °C). His blood pressure is 116/67 and his pulse is 60. His respiration is 19 and oxygen saturation is 97%.     Chief Complaint: Otalgia    Otalgia    There is pain in both ears. This is a new problem. The current episode started 1 to 4 weeks ago. The problem occurs constantly. The problem has been unchanged. There has been no fever. The pain is at a severity of 0/10. The patient is experiencing no pain. Pertinent negatives include no abdominal pain, diarrhea, headaches, rash, sore throat or vomiting. He has tried ear drops for the symptoms. The treatment provided no relief. There is no history of a chronic ear infection, hearing loss or a tympanostomy tube.     Review of Systems   Constitution: Negative for chills and fever.   HENT: Positive for ear pain. Negative for sore throat.    Eyes: Negative for blurred vision.   Cardiovascular: Negative for chest pain.   Respiratory: Negative for shortness of breath.    Skin: Negative for rash.   Musculoskeletal: Negative for back pain and joint pain.   Gastrointestinal: Negative for abdominal pain, diarrhea, nausea and vomiting.   Neurological: Negative for headaches.   Psychiatric/Behavioral: The patient is not nervous/anxious.        Objective:      Physical Exam   Constitutional: He is oriented to person, place, and time. He appears well-developed and well-nourished. He is cooperative.  Non-toxic appearance. He does not appear ill. No distress.   HENT:   Head: Normocephalic and atraumatic.   Right Ear: Hearing, tympanic membrane and ear canal normal.   Left Ear: Decreased hearing is noted.   Ears:    Nose: No mucosal edema, rhinorrhea or nasal deformity. No epistaxis. Right sinus exhibits no maxillary sinus tenderness and no frontal sinus tenderness. Left sinus exhibits no maxillary sinus tenderness " and no frontal sinus tenderness.   Mouth/Throat: Uvula is midline and mucous membranes are normal. No trismus in the jaw. Normal dentition. No uvula swelling. No posterior oropharyngeal erythema.   Eyes: Conjunctivae and lids are normal. Right eye exhibits no discharge. Left eye exhibits no discharge. No scleral icterus.   Sclera clear bilat   Neck: Trachea normal, normal range of motion, full passive range of motion without pain and phonation normal. Neck supple.   Cardiovascular: Normal rate, regular rhythm, normal heart sounds, intact distal pulses and normal pulses.    Pulmonary/Chest: Effort normal and breath sounds normal. No respiratory distress.   Abdominal: Soft. Normal appearance. He exhibits no distension, no pulsatile midline mass and no mass. There is no tenderness.   Musculoskeletal: Normal range of motion. He exhibits no edema or deformity.   Neurological: He is alert and oriented to person, place, and time. He exhibits normal muscle tone. Coordination normal.   Skin: Skin is warm, dry and intact. He is not diaphoretic. No pallor.   Psychiatric: He has a normal mood and affect. His speech is normal and behavior is normal. Judgment and thought content normal. Cognition and memory are normal.   Nursing note and vitals reviewed.      Assessment:       1. Impacted cerumen of left ear        Plan:       Patient Instructions     Earwax Removal    The ear canal makes earwax from the canals lining. The ears make wax to lubricate and protect the ear canal. The ear canal is the tube that connects the middle ear to the outside of the ear. The wax protects the ear from bacteria, infection, and damage from water or trauma.  The wax that forms in the canal naturally moves toward the outside of the ear and falls out. In some cases, the ear may make too much wax. If the wax causes problems or keeps the healthcare provider from seeing into the ear, the extra wax may be removed.  Too much wax can affect your hearing.  It can cause itching. In rare cases, it can be painful. Earwax should not be removed unless it is causing a problem. You should not stick objects into your ear to remove wax unless told to do so by your healthcare provider.  Healthcare providers can remove earwax safely. It is important to stay still during the procedure to avoid damage to the ear canal. But removing earwax generally doesnt hurt. You will not usually need anesthesia or pain medicine when the provider removes the earwax.  A number of conditions lead to earwax buildup. These include some skin problems, a narrow ear canal, or ears that make too much earwax. Using cotton swabs in the canal pushes earwax deeper into the ear and contributes to the buildup of earwax.  Home care  · The healthcare provider may recommend mineral oil or an over-the-counter eardrop to use at home to soften the earwax. Use these products only if the provider recommends them. Use these products only if the provider recommends them. Carefully follow the instructions given.  · Dont use mineral oil or OTC eardrops if you might have an ear infection or a ruptured eardrum. Tell your healthcare provider right away if you have diabetes or an immune disorder.  · Dont use cotton swabs in your ears. Cotton swabs may push wax deeper into the ear canal or damage the eardrum. Use cotton gauze or a wet washcloth  to gently remove wax on the outside of the ear and around the opening to the ear canal.  · Don't use any probing device or object such as cotton-tipped swabs or feli pins to clean the inside of your ears.  · Dont use ear candles to clean your ears. Candling can be dangerous. It can burn the ear canal. It can also make the condition worse instead of better.  · Dont use cold water to rinse the ear. This will make you dizzy. If your provider tells you to rinse your ear, use only warm water or follow his or her instructions.  · Check the ear for signs of infection or irritation  listed below under When to seek medical advice.  Steps for using eardrops  1. Warm the medicine bottle by rubbing it between your hands for a few minutes.  2. Lie down on your side, with the affected ear up.  3. Place the recommended number of drops in the ear. Wet a cotton ball with the medicine. Gently put the cotton ball into the ear opening.  Follow-up care  Follow up with your healthcare provider, or as directed.  When to seek medical advice  Call the provider right away if you have:  · Ear pain that gets worse  · Fever of 100.4F°F (38°C) or higher, or as directed by your healthcare provider  · Worsening wax buildup  · Severe pain, dizziness, or nausea  · Bleeding from the ear  · Hearing problems  · Signs of irritation from the eardrops, such as burning, stinging, or swelling and tenderness  · Foul-smelling fluid draining from the ear  · Swelling, redness, or tenderness of the outer ear  · Headache, neck pain, or stiff neck  Date Last Reviewed: 3/22/2015  © 2363-6676 [employer]. [address] [place] [address], [place] [address]. All rights reserved. This information is not intended as a substitute for professional medical care. Always follow your healthcare professional's instructions.            Impacted cerumen of left ear  -     Ear wax removal

## 2018-07-31 NOTE — PATIENT INSTRUCTIONS
Earwax Removal    The ear canal makes earwax from the canals lining. The ears make wax to lubricate and protect the ear canal. The ear canal is the tube that connects the middle ear to the outside of the ear. The wax protects the ear from bacteria, infection, and damage from water or trauma.  The wax that forms in the canal naturally moves toward the outside of the ear and falls out. In some cases, the ear may make too much wax. If the wax causes problems or keeps the healthcare provider from seeing into the ear, the extra wax may be removed.  Too much wax can affect your hearing. It can cause itching. In rare cases, it can be painful. Earwax should not be removed unless it is causing a problem. You should not stick objects into your ear to remove wax unless told to do so by your healthcare provider.  Healthcare providers can remove earwax safely. It is important to stay still during the procedure to avoid damage to the ear canal. But removing earwax generally doesnt hurt. You will not usually need anesthesia or pain medicine when the provider removes the earwax.  A number of conditions lead to earwax buildup. These include some skin problems, a narrow ear canal, or ears that make too much earwax. Using cotton swabs in the canal pushes earwax deeper into the ear and contributes to the buildup of earwax.  Home care  · The healthcare provider may recommend mineral oil or an over-the-counter eardrop to use at home to soften the earwax. Use these products only if the provider recommends them. Use these products only if the provider recommends them. Carefully follow the instructions given.  · Dont use mineral oil or OTC eardrops if you might have an ear infection or a ruptured eardrum. Tell your healthcare provider right away if you have diabetes or an immune disorder.  · Dont use cotton swabs in your ears. Cotton swabs may push wax deeper into the ear canal or damage the eardrum. Use cotton gauze or a wet  washcloth  to gently remove wax on the outside of the ear and around the opening to the ear canal.  · Don't use any probing device or object such as cotton-tipped swabs or feli pins to clean the inside of your ears.  · Dont use ear candles to clean your ears. Candling can be dangerous. It can burn the ear canal. It can also make the condition worse instead of better.  · Dont use cold water to rinse the ear. This will make you dizzy. If your provider tells you to rinse your ear, use only warm water or follow his or her instructions.  · Check the ear for signs of infection or irritation listed below under When to seek medical advice.  Steps for using eardrops  1. Warm the medicine bottle by rubbing it between your hands for a few minutes.  2. Lie down on your side, with the affected ear up.  3. Place the recommended number of drops in the ear. Wet a cotton ball with the medicine. Gently put the cotton ball into the ear opening.  Follow-up care  Follow up with your healthcare provider, or as directed.  When to seek medical advice  Call the provider right away if you have:  · Ear pain that gets worse  · Fever of 100.4F°F (38°C) or higher, or as directed by your healthcare provider  · Worsening wax buildup  · Severe pain, dizziness, or nausea  · Bleeding from the ear  · Hearing problems  · Signs of irritation from the eardrops, such as burning, stinging, or swelling and tenderness  · Foul-smelling fluid draining from the ear  · Swelling, redness, or tenderness of the outer ear  · Headache, neck pain, or stiff neck  Date Last Reviewed: 3/22/2015  © 4409-9731 [employer]. [address] [place] [address], [place] [address]. All rights reserved. This information is not intended as a substitute for professional medical care. Always follow your healthcare professional's instructions.

## 2018-10-08 ENCOUNTER — TELEPHONE (OUTPATIENT)
Dept: INTERNAL MEDICINE | Facility: CLINIC | Age: 68
End: 2018-10-08

## 2018-10-08 DIAGNOSIS — I10 ESSENTIAL HYPERTENSION: ICD-10-CM

## 2018-10-08 DIAGNOSIS — E55.9 VITAMIN D DEFICIENCY: ICD-10-CM

## 2018-10-08 DIAGNOSIS — I45.10 RBBB: ICD-10-CM

## 2018-10-08 DIAGNOSIS — Z00.00 WELL ADULT EXAM: Primary | ICD-10-CM

## 2018-10-08 DIAGNOSIS — L40.9 PSORIASIS: ICD-10-CM

## 2018-10-08 DIAGNOSIS — Z12.5 PROSTATE CANCER SCREENING: ICD-10-CM

## 2018-10-08 NOTE — TELEPHONE ENCOUNTER
----- Message from Gemma Patricia sent at 10/8/2018  3:34 PM CDT -----  Doctor appointment and lab have been scheduled.  Please link lab orders to the lab appointment.  Date of doctor appointment:  1/25  Physical or EP:  EPP   Date of lab appointment:  1/18  Comments:

## 2018-11-01 ENCOUNTER — OFFICE VISIT (OUTPATIENT)
Dept: URGENT CARE | Facility: CLINIC | Age: 68
End: 2018-11-01
Payer: MEDICARE

## 2018-11-01 VITALS
TEMPERATURE: 98 F | WEIGHT: 168 LBS | DIASTOLIC BLOOD PRESSURE: 63 MMHG | RESPIRATION RATE: 18 BRPM | OXYGEN SATURATION: 98 % | BODY MASS INDEX: 24.88 KG/M2 | HEART RATE: 57 BPM | SYSTOLIC BLOOD PRESSURE: 144 MMHG | HEIGHT: 69 IN

## 2018-11-01 DIAGNOSIS — R22.31 SKIN LUMP OF ARM, RIGHT: Primary | ICD-10-CM

## 2018-11-01 DIAGNOSIS — Z86.018 HISTORY OF LIPOMA: ICD-10-CM

## 2018-11-01 PROCEDURE — 3077F SYST BP >= 140 MM HG: CPT | Mod: CPTII,S$GLB,, | Performed by: NURSE PRACTITIONER

## 2018-11-01 PROCEDURE — 3078F DIAST BP <80 MM HG: CPT | Mod: CPTII,S$GLB,, | Performed by: NURSE PRACTITIONER

## 2018-11-01 PROCEDURE — 1101F PT FALLS ASSESS-DOCD LE1/YR: CPT | Mod: CPTII,S$GLB,, | Performed by: NURSE PRACTITIONER

## 2018-11-01 PROCEDURE — 99214 OFFICE O/P EST MOD 30 MIN: CPT | Mod: S$GLB,,, | Performed by: NURSE PRACTITIONER

## 2018-11-01 NOTE — PROGRESS NOTES
"Subjective:       Patient ID: Joseph Rizvi Jr. is a 68 y.o. male.    Vitals:  height is 5' 9" (1.753 m) and weight is 76.2 kg (168 lb). His temperature is 98 °F (36.7 °C). His blood pressure is 144/63 (abnormal) and his pulse is 57 (abnormal). His respiration is 18 and oxygen saturation is 98%.     Chief Complaint: Trauma    Patient injured himself a year in a half ago. He is curious to know what or why the lump is still present. HX OF LYPOMA-REPORTS ONE ON LEFT UE. FAMILY HX OF LYPOMA.   REPORTS INJURY 1.5 YEARS AGO WAS BIG PALM BRANCH HITTING ARM, CAUSING A BRUISE-NO SKIN ABRASION, NO BLEEDING, NO FOREIGN BODY INJURY. DENIES PURULENT DRAINAGE, DENIES PAIN TO SITE.      Trauma   Incident onset: 1 year ago or more. The incident occurred at work. The injury mechanism was a direct blow. Context: a tree. No protective equipment was used. There is an injury to the right forearm. He is experiencing no pain. It is unlikely that a foreign body is present. Pertinent negatives include no abdominal pain, chest pain, neck pain, numbness or weakness. There have been prior injuries to these areas. His tetanus status is unknown.     Review of Systems   Constitution: Negative for weakness and malaise/fatigue.   HENT: Negative for nosebleeds.    Cardiovascular: Negative for chest pain and syncope.   Respiratory: Negative for shortness of breath.    Skin: Positive for suspicious lesions.   Musculoskeletal: Negative for back pain, joint pain and neck pain.   Gastrointestinal: Negative for abdominal pain.   Genitourinary: Negative for hematuria.   Neurological: Negative for dizziness and numbness.       Objective:      Physical Exam   Constitutional: He is oriented to person, place, and time. He appears well-developed and well-nourished.   HENT:   Head: Normocephalic and atraumatic. Head is without abrasion, without contusion and without laceration.   Right Ear: External ear normal.   Left Ear: External ear normal.   Nose: Nose " normal.   Mouth/Throat: Oropharynx is clear and moist.   Eyes: Conjunctivae, EOM and lids are normal. Pupils are equal, round, and reactive to light.   Neck: Trachea normal, full passive range of motion without pain and phonation normal. Neck supple.   Cardiovascular: Normal rate, regular rhythm and normal heart sounds.   Pulmonary/Chest: Effort normal and breath sounds normal. No stridor. No respiratory distress.   Musculoskeletal: Normal range of motion.   Neurological: He is alert and oriented to person, place, and time.   Skin: Skin is warm, dry and intact. Capillary refill takes less than 2 seconds. No abrasion, no bruising, no burn, no ecchymosis, no laceration, no lesion and no rash noted. No erythema.        RIGHT FOREARM: APPROX 2CM DIAMETER ROUND, PALPABLE LUMP UNDER/WITHIN SUBCUTANEOUS TISSUE. NO TTP, NO ERYTHEMA, NO DRAINAGE, NO FLUCTUANCE, NO PURULENCE, MOVABLE, SKIN INTACT.    LEFT UPPER ARM-APPROX 4CM ROUND, PALPABLE LUMP, NO TTP-NO EYRTHEMA, NO FLUCTUANCE, SKIN INTACT.   Psychiatric: He has a normal mood and affect. His speech is normal and behavior is normal. Judgment and thought content normal. Cognition and memory are normal.   Nursing note and vitals reviewed.      Assessment:       1. Skin lump of arm, right    2. History of lipoma        Plan:     TIARA CALLED TO SET UP OUTPATIENT ULTRASOUND.     Skin lump of arm, right  -     US Soft Tissue Misc; Future; Expected date: 11/01/2018    History of lipoma      Patient Instructions     Follow up with your doctor in a few days.  Return to the urgent care or go to the ER if symptoms get worse.    ORDER PLACED FOR OUTPATIENT ULTRASOUND OF RIGHT ARM. ONCE THE TEST IS COMPLETE AND RESULTED, WE WILL CALL YOU.    Please call [phone] if you do not hear from [facility] to get your referral appointment we discussed.        Understanding a Lipoma    A lipoma is a benign lump under the skin thats made of fat. Its not cancer. It feels soft like  rubber when you press it, and in most cases it doesnt hurt. Some people have more than one. A lipoma grows slowly over time and doesnt cause many problems. Lipomas occur most often in adults from ages 40 to 60, and more often in men.  How to say it  Ly-POH-venus   What causes a lipoma?  The cause is not yet known. Experts are still learning more. It may be partly caused by a problem in a gene. They can run in families. Familial multiple lipomatosis is when 2 or more family members have many lipomas.  Symptoms of a lipoma  The main symptom of a lipoma is a soft lump under the skin that doesnt hurt unless it is pressing on a nerve. It may be small, around 1/4 inch across. Or it may be larger, up to 4 inches across or more.  There are different kinds of lipomas. The most common kind occurs under the skin of the shoulders, chest, back, belly, or under the arms. In some cases, a lipoma can occur on the legs. In rare cases, one may occur deeper in the body or in a muscle.  Treatment for a lipoma  In most cases, a lipoma doesnt need treatment. Your healthcare provider may look at it during regular checkups to see if it changes.  But if the lipoma is painful or you want it removed for cosmetic reasons, it can be removed with surgery. The surgery is called excision. The lipoma will most likely not grow back after surgery. During surgery, the area around the lipoma is numbed. If you have a deep lipoma, you may need medicine called regional anesthesia to numb a larger area. Or you may need medicine called general anesthesia to put you to sleep during the procedure. Then the doctor makes a cut over the area of the lipoma. He or she removes the lump of fat. The cut is then closed with stitches.  Possible complications of a lipoma  A large lipoma inside the body can press on organs, nerves, or other tissues and cause problems. For example, it can cause problems with breathing or digestion.  Living with a lipoma  Your healthcare  provider may look at the lipoma during regular checkups to see if it changes or is causing problems.  When to call your healthcare provider  Call your healthcare provider right away if you have any of these:  · Lipoma that grows quickly, causes pain, or feels hard  · Growth of new lipomas   Date Last Reviewed: 5/1/2016  © 7660-0460 [employer], [employer]. [address] [place] [address], [place] [address]. All rights reserved. This information is not intended as a substitute for professional medical care. Always follow your healthcare professional's instructions.

## 2018-11-01 NOTE — PATIENT INSTRUCTIONS
Follow up with your doctor in a few days.  Return to the urgent care or go to the ER if symptoms get worse.    ORDER PLACED FOR OUTPATIENT ULTRASOUND OF RIGHT ARM. ONCE THE TEST IS COMPLETE AND RESULTED, WE WILL CALL YOU.    Please call [phone] if you do not hear from [facility] to get your referral appointment we discussed.        Understanding a Lipoma    A lipoma is a benign lump under the skin thats made of fat. Its not cancer. It feels soft like rubber when you press it, and in most cases it doesnt hurt. Some people have more than one. A lipoma grows slowly over time and doesnt cause many problems. Lipomas occur most often in adults from ages 40 to 60, and more often in men.  How to say it  Ly-POH-venus   What causes a lipoma?  The cause is not yet known. Experts are still learning more. It may be partly caused by a problem in a gene. They can run in families. Familial multiple lipomatosis is when 2 or more family members have many lipomas.  Symptoms of a lipoma  The main symptom of a lipoma is a soft lump under the skin that doesnt hurt unless it is pressing on a nerve. It may be small, around 1/4 inch across. Or it may be larger, up to 4 inches across or more.  There are different kinds of lipomas. The most common kind occurs under the skin of the shoulders, chest, back, belly, or under the arms. In some cases, a lipoma can occur on the legs. In rare cases, one may occur deeper in the body or in a muscle.  Treatment for a lipoma  In most cases, a lipoma doesnt need treatment. Your healthcare provider may look at it during regular checkups to see if it changes.  But if the lipoma is painful or you want it removed for cosmetic reasons, it can be removed with surgery. The surgery is called excision. The lipoma will most likely not grow back after surgery. During surgery, the area around the lipoma is numbed. If you have a deep lipoma, you may need medicine called regional anesthesia to numb a larger  area. Or you may need medicine called general anesthesia to put you to sleep during the procedure. Then the doctor makes a cut over the area of the lipoma. He or she removes the lump of fat. The cut is then closed with stitches.  Possible complications of a lipoma  A large lipoma inside the body can press on organs, nerves, or other tissues and cause problems. For example, it can cause problems with breathing or digestion.  Living with a lipoma  Your healthcare provider may look at the lipoma during regular checkups to see if it changes or is causing problems.  When to call your healthcare provider  Call your healthcare provider right away if you have any of these:  · Lipoma that grows quickly, causes pain, or feels hard  · Growth of new lipomas   Date Last Reviewed: 5/1/2016  © 0862-5520 [employer]. [address] [place] [address], [place] [address]. All rights reserved. This information is not intended as a substitute for professional medical care. Always follow your healthcare professional's instructions.

## 2018-11-30 ENCOUNTER — TELEPHONE (OUTPATIENT)
Dept: CARDIOLOGY | Facility: CLINIC | Age: 68
End: 2018-11-30

## 2018-11-30 DIAGNOSIS — I87.2 VENOUS INSUFFICIENCY OF LOWER EXTREMITY, UNSPECIFIED LATERALITY: Primary | ICD-10-CM

## 2018-11-30 NOTE — TELEPHONE ENCOUNTER
Would you please enter a venous insufficiency US order for patient to have prior to follow up.

## 2019-01-09 ENCOUNTER — HOSPITAL ENCOUNTER (OUTPATIENT)
Dept: RADIOLOGY | Facility: HOSPITAL | Age: 69
Discharge: HOME | End: 2019-01-09
Attending: INTERNAL MEDICINE
Payer: MEDICARE

## 2019-01-09 DIAGNOSIS — I87.2 VENOUS INSUFFICIENCY OF LOWER EXTREMITY, UNSPECIFIED LATERALITY: ICD-10-CM

## 2019-01-09 PROCEDURE — 93970 EXTREMITY STUDY: CPT | Mod: TC,HCNC

## 2019-01-09 PROCEDURE — 93970 US LOWER EXTREMITY VEINS BILATERAL INSUFFICIENCY: ICD-10-PCS | Mod: 26,HCNC,, | Performed by: RADIOLOGY

## 2019-01-09 PROCEDURE — 93970 EXTREMITY STUDY: CPT | Mod: 26,HCNC,, | Performed by: RADIOLOGY

## 2019-01-18 ENCOUNTER — LAB VISIT (OUTPATIENT)
Dept: LAB | Facility: HOSPITAL | Age: 69
End: 2019-01-18
Attending: FAMILY MEDICINE
Payer: MEDICARE

## 2019-01-18 DIAGNOSIS — Z00.00 WELL ADULT EXAM: ICD-10-CM

## 2019-01-18 DIAGNOSIS — I10 ESSENTIAL HYPERTENSION: ICD-10-CM

## 2019-01-18 DIAGNOSIS — E55.9 VITAMIN D DEFICIENCY: ICD-10-CM

## 2019-01-18 DIAGNOSIS — I45.10 RBBB: ICD-10-CM

## 2019-01-18 DIAGNOSIS — L40.9 PSORIASIS: ICD-10-CM

## 2019-01-18 DIAGNOSIS — Z12.5 PROSTATE CANCER SCREENING: ICD-10-CM

## 2019-01-18 LAB
25(OH)D3+25(OH)D2 SERPL-MCNC: 19 NG/ML
ALBUMIN SERPL BCP-MCNC: 3.8 G/DL
ALP SERPL-CCNC: 71 U/L
ALT SERPL W/O P-5'-P-CCNC: 19 U/L
ANION GAP SERPL CALC-SCNC: 7 MMOL/L
AST SERPL-CCNC: 26 U/L
BASOPHILS # BLD AUTO: 0.03 K/UL
BASOPHILS NFR BLD: 0.6 %
BILIRUB SERPL-MCNC: 1.6 MG/DL
BUN SERPL-MCNC: 20 MG/DL
CALCIUM SERPL-MCNC: 9.4 MG/DL
CHLORIDE SERPL-SCNC: 106 MMOL/L
CHOLEST SERPL-MCNC: 134 MG/DL
CHOLEST/HDLC SERPL: 2.7 {RATIO}
CO2 SERPL-SCNC: 28 MMOL/L
COMPLEXED PSA SERPL-MCNC: 0.98 NG/ML
CREAT SERPL-MCNC: 0.9 MG/DL
DIFFERENTIAL METHOD: ABNORMAL
EOSINOPHIL # BLD AUTO: 0.1 K/UL
EOSINOPHIL NFR BLD: 1.5 %
ERYTHROCYTE [DISTWIDTH] IN BLOOD BY AUTOMATED COUNT: 12.3 %
EST. GFR  (AFRICAN AMERICAN): >60 ML/MIN/1.73 M^2
EST. GFR  (NON AFRICAN AMERICAN): >60 ML/MIN/1.73 M^2
GLUCOSE SERPL-MCNC: 85 MG/DL
HCT VFR BLD AUTO: 43.1 %
HDLC SERPL-MCNC: 49 MG/DL
HDLC SERPL: 36.6 %
HGB BLD-MCNC: 14.3 G/DL
IMM GRANULOCYTES # BLD AUTO: 0.01 K/UL
IMM GRANULOCYTES NFR BLD AUTO: 0.2 %
LDLC SERPL CALC-MCNC: 76.6 MG/DL
LYMPHOCYTES # BLD AUTO: 2.1 K/UL
LYMPHOCYTES NFR BLD: 39.9 %
MCH RBC QN AUTO: 31.8 PG
MCHC RBC AUTO-ENTMCNC: 33.2 G/DL
MCV RBC AUTO: 96 FL
MONOCYTES # BLD AUTO: 0.6 K/UL
MONOCYTES NFR BLD: 10.7 %
NEUTROPHILS # BLD AUTO: 2.5 K/UL
NEUTROPHILS NFR BLD: 47.1 %
NONHDLC SERPL-MCNC: 85 MG/DL
NRBC BLD-RTO: 0 /100 WBC
PLATELET # BLD AUTO: 250 K/UL
PMV BLD AUTO: 10.2 FL
POTASSIUM SERPL-SCNC: 4.8 MMOL/L
PROT SERPL-MCNC: 7.1 G/DL
RBC # BLD AUTO: 4.49 M/UL
SODIUM SERPL-SCNC: 141 MMOL/L
T4 FREE SERPL-MCNC: 0.9 NG/DL
TRIGL SERPL-MCNC: 42 MG/DL
TSH SERPL DL<=0.005 MIU/L-ACNC: 1.07 UIU/ML
WBC # BLD AUTO: 5.34 K/UL

## 2019-01-18 PROCEDURE — 82306 VITAMIN D 25 HYDROXY: CPT | Mod: HCNC

## 2019-01-18 PROCEDURE — 84153 ASSAY OF PSA TOTAL: CPT | Mod: HCNC

## 2019-01-18 PROCEDURE — 84439 ASSAY OF FREE THYROXINE: CPT | Mod: HCNC

## 2019-01-18 PROCEDURE — 85025 COMPLETE CBC W/AUTO DIFF WBC: CPT | Mod: HCNC

## 2019-01-18 PROCEDURE — 36415 COLL VENOUS BLD VENIPUNCTURE: CPT | Mod: HCNC,PO

## 2019-01-18 PROCEDURE — 80053 COMPREHEN METABOLIC PANEL: CPT | Mod: HCNC

## 2019-01-18 PROCEDURE — 80061 LIPID PANEL: CPT | Mod: HCNC

## 2019-01-18 PROCEDURE — 84443 ASSAY THYROID STIM HORMONE: CPT | Mod: HCNC

## 2019-01-20 ENCOUNTER — PATIENT MESSAGE (OUTPATIENT)
Dept: INTERNAL MEDICINE | Facility: CLINIC | Age: 69
End: 2019-01-20

## 2019-01-20 DIAGNOSIS — E55.9 VITAMIN D DEFICIENCY: Primary | ICD-10-CM

## 2019-01-20 RX ORDER — ERGOCALCIFEROL 1.25 MG/1
50000 CAPSULE ORAL
Qty: 4 CAPSULE | Refills: 2 | Status: SHIPPED | OUTPATIENT
Start: 2019-01-20 | End: 2019-04-09 | Stop reason: SDUPTHER

## 2019-01-25 ENCOUNTER — HOSPITAL ENCOUNTER (OUTPATIENT)
Dept: RADIOLOGY | Facility: OTHER | Age: 69
Discharge: HOME | End: 2019-01-25
Attending: FAMILY MEDICINE
Payer: MEDICARE

## 2019-01-25 ENCOUNTER — OFFICE VISIT (OUTPATIENT)
Dept: INTERNAL MEDICINE | Facility: CLINIC | Age: 69
End: 2019-01-25
Payer: MEDICARE

## 2019-01-25 VITALS
RESPIRATION RATE: 16 BRPM | TEMPERATURE: 99 F | BODY MASS INDEX: 25.4 KG/M2 | HEIGHT: 69 IN | HEART RATE: 63 BPM | SYSTOLIC BLOOD PRESSURE: 115 MMHG | WEIGHT: 171.5 LBS | DIASTOLIC BLOOD PRESSURE: 60 MMHG

## 2019-01-25 DIAGNOSIS — I10 ESSENTIAL HYPERTENSION: ICD-10-CM

## 2019-01-25 DIAGNOSIS — I45.10 RBBB: ICD-10-CM

## 2019-01-25 DIAGNOSIS — R22.31 ARM MASS, RIGHT: ICD-10-CM

## 2019-01-25 DIAGNOSIS — Z00.00 WELL ADULT EXAM: Primary | ICD-10-CM

## 2019-01-25 DIAGNOSIS — E55.9 VITAMIN D DEFICIENCY: ICD-10-CM

## 2019-01-25 DIAGNOSIS — L40.9 PSORIASIS: ICD-10-CM

## 2019-01-25 PROCEDURE — 76882 PR  US,EXTREMITY,NONVASCULAR,REAL-TIME IMAGE,LIMITED: ICD-10-PCS | Mod: 26,HCNC,RT, | Performed by: RADIOLOGY

## 2019-01-25 PROCEDURE — 3078F DIAST BP <80 MM HG: CPT | Mod: HCNC,CPTII,S$GLB, | Performed by: FAMILY MEDICINE

## 2019-01-25 PROCEDURE — 76882 US LMTD JT/FCL EVL NVASC XTR: CPT | Mod: 26,HCNC,RT, | Performed by: RADIOLOGY

## 2019-01-25 PROCEDURE — 76999 ECHO EXAMINATION PROCEDURE: CPT | Mod: TC,HCNC

## 2019-01-25 PROCEDURE — 99397 PR PREVENTIVE VISIT,EST,65 & OVER: ICD-10-PCS | Mod: HCNC,S$GLB,, | Performed by: FAMILY MEDICINE

## 2019-01-25 PROCEDURE — 99397 PER PM REEVAL EST PAT 65+ YR: CPT | Mod: HCNC,S$GLB,, | Performed by: FAMILY MEDICINE

## 2019-01-25 PROCEDURE — 99999 PR PBB SHADOW E&M-EST. PATIENT-LVL III: ICD-10-PCS | Mod: PBBFAC,HCNC,, | Performed by: FAMILY MEDICINE

## 2019-01-25 PROCEDURE — 3078F PR MOST RECENT DIASTOLIC BLOOD PRESSURE < 80 MM HG: ICD-10-PCS | Mod: HCNC,CPTII,S$GLB, | Performed by: FAMILY MEDICINE

## 2019-01-25 PROCEDURE — 3074F PR MOST RECENT SYSTOLIC BLOOD PRESSURE < 130 MM HG: ICD-10-PCS | Mod: HCNC,CPTII,S$GLB, | Performed by: FAMILY MEDICINE

## 2019-01-25 PROCEDURE — 3074F SYST BP LT 130 MM HG: CPT | Mod: HCNC,CPTII,S$GLB, | Performed by: FAMILY MEDICINE

## 2019-01-25 PROCEDURE — 99999 PR PBB SHADOW E&M-EST. PATIENT-LVL III: CPT | Mod: PBBFAC,HCNC,, | Performed by: FAMILY MEDICINE

## 2019-01-25 RX ORDER — IRBESARTAN 150 MG/1
150 TABLET ORAL NIGHTLY
Qty: 90 TABLET | Refills: 3 | Status: SHIPPED | OUTPATIENT
Start: 2019-01-25 | End: 2020-04-16 | Stop reason: SDUPTHER

## 2019-01-25 NOTE — TELEPHONE ENCOUNTER
Pt seen in clinic today.   Made repeat vitamin D lab appt .  Gave lab appt time letter to pt in clinic

## 2019-01-25 NOTE — PROGRESS NOTES
Subjective:       Patient ID: Joseph Rizvi Jr. is a 69 y.o. male.    Chief Complaint: Annual Exam    HPI 69-year-old white male presents to clinic today for annual physical exam.  He continues to have well-controlled hypertension on Avapro 150 mg daily.  He has seen Cardiology in the past for evaluation of right bundle branch block which currently remains stable.  He has a past surgical history of lipoma removal.  He has a family history of schizophrenia.  His mother passed away from overdose.  His father passed away from dementia and hypertension.  He also has a family history of melanoma.  He is up-to-date with all vaccinations.  Finally, he is concerned secondary to noticing a lump to his right forearm that has been present for over a year and a half status post injury.  Review of Systems   Constitutional: Negative for appetite change, chills, fatigue and fever.   HENT: Negative for congestion, ear pain, hearing loss, postnasal drip, rhinorrhea, sinus pressure, sore throat and tinnitus.    Eyes: Negative for redness, itching and visual disturbance.   Respiratory: Negative for cough, chest tightness and shortness of breath.    Cardiovascular: Negative for chest pain and palpitations.   Gastrointestinal: Negative for abdominal pain, constipation, diarrhea, nausea and vomiting.   Genitourinary: Negative for decreased urine volume, difficulty urinating, dysuria, frequency, hematuria and urgency.   Musculoskeletal: Negative for back pain, myalgias, neck pain and neck stiffness.   Skin: Negative for rash.   Neurological: Negative for dizziness, light-headedness and headaches.   Psychiatric/Behavioral: Negative.        Objective:      Physical Exam   Constitutional: He is oriented to person, place, and time. He appears well-developed and well-nourished. No distress.   HENT:   Head: Normocephalic and atraumatic.   Right Ear: External ear normal.   Left Ear: External ear normal.   Nose: Nose normal.   Mouth/Throat:  Oropharynx is clear and moist. No oropharyngeal exudate.   Eyes: Conjunctivae and EOM are normal. Pupils are equal, round, and reactive to light. Right eye exhibits no discharge. Left eye exhibits no discharge. No scleral icterus.   Neck: Normal range of motion. Neck supple. No JVD present. No tracheal deviation present. No thyromegaly present.   Cardiovascular: Normal rate, regular rhythm, normal heart sounds and intact distal pulses. Exam reveals no gallop and no friction rub.   No murmur heard.  Pulmonary/Chest: Effort normal and breath sounds normal. No stridor. No respiratory distress. He has no wheezes. He has no rales.   Abdominal: Soft. Bowel sounds are normal. He exhibits no distension and no mass. There is no tenderness. There is no rebound and no guarding.   Musculoskeletal: Normal range of motion. He exhibits no edema or tenderness.        Arms:  Lymphadenopathy:     He has no cervical adenopathy.   Neurological: He is alert and oriented to person, place, and time.   Skin: Skin is warm and dry. No rash noted. He is not diaphoretic. No erythema. No pallor.   Psychiatric: He has a normal mood and affect. His behavior is normal. Judgment and thought content normal.   Nursing note and vitals reviewed.      Assessment:       1. Well adult exam    2. Essential hypertension    3. Psoriasis    4. RBBB    5. Vitamin D deficiency    6. Arm mass, right        Plan:       1.  Labs have been reviewed and were overall within normal limits except for vitamin-D deficiency.  2.  Continue Avapro 150 mg daily.  Hypertension is well controlled.  3.  Continue follow-up with Dermatology as scheduled.  Psoriasis remained stable.  4.  Right bundle-branch block remains well controlled.  5.  Vitamin-D 43427 units once weekly for 3 months.  Repeat vitamin-D level in 3 months.  6.  Ultrasound of the right forearm for further evaluation of mass.  7.  Return to clinic as needed or in 1 year for annual exam.

## 2019-01-27 ENCOUNTER — PATIENT MESSAGE (OUTPATIENT)
Dept: INTERNAL MEDICINE | Facility: CLINIC | Age: 69
End: 2019-01-27

## 2019-01-27 DIAGNOSIS — L02.91 PHLEGMON: Primary | ICD-10-CM

## 2019-01-27 RX ORDER — SULFAMETHOXAZOLE AND TRIMETHOPRIM 800; 160 MG/1; MG/1
1 TABLET ORAL 2 TIMES DAILY
Qty: 20 TABLET | Refills: 0 | Status: SHIPPED | OUTPATIENT
Start: 2019-01-27 | End: 2019-02-06

## 2019-02-04 ENCOUNTER — TELEPHONE (OUTPATIENT)
Dept: INTERNAL MEDICINE | Facility: CLINIC | Age: 69
End: 2019-02-04

## 2019-02-04 NOTE — TELEPHONE ENCOUNTER
Spoke with pt re: arm abscess. Pt has not read the information on his portal.   I explained to the pt that he needs to start ABX & I will have the coordinators call him back to schedule an appt w/ a surgeon to eval & treat.    Pt verbalized understanding

## 2019-02-04 NOTE — TELEPHONE ENCOUNTER
----- Message from Cynthia Griffith sent at 2/4/2019  1:50 PM CST -----  Contact: Patient came by - [phone]  Jani,    Patient came by to speak to you in referenced to results on an ultrasound that he had on his right arm, please give patient a call whenever you get a chance.  Patient can be reached at [phone].    Thanks Gay

## 2019-02-13 ENCOUNTER — OFFICE VISIT (OUTPATIENT)
Dept: SURGERY | Facility: CLINIC | Age: 69
End: 2019-02-13
Payer: MEDICARE

## 2019-02-13 VITALS
WEIGHT: 168.88 LBS | DIASTOLIC BLOOD PRESSURE: 72 MMHG | BODY MASS INDEX: 25.01 KG/M2 | HEIGHT: 69 IN | HEART RATE: 65 BPM | TEMPERATURE: 99 F | OXYGEN SATURATION: 98 % | SYSTOLIC BLOOD PRESSURE: 138 MMHG

## 2019-02-13 DIAGNOSIS — S40.851A: Primary | ICD-10-CM

## 2019-02-13 PROCEDURE — 3078F DIAST BP <80 MM HG: CPT | Mod: HCNC,CPTII,S$GLB, | Performed by: STUDENT IN AN ORGANIZED HEALTH CARE EDUCATION/TRAINING PROGRAM

## 2019-02-13 PROCEDURE — 3078F PR MOST RECENT DIASTOLIC BLOOD PRESSURE < 80 MM HG: ICD-10-PCS | Mod: HCNC,CPTII,S$GLB, | Performed by: STUDENT IN AN ORGANIZED HEALTH CARE EDUCATION/TRAINING PROGRAM

## 2019-02-13 PROCEDURE — 99999 PR PBB SHADOW E&M-EST. PATIENT-LVL III: CPT | Mod: PBBFAC,HCNC,, | Performed by: STUDENT IN AN ORGANIZED HEALTH CARE EDUCATION/TRAINING PROGRAM

## 2019-02-13 PROCEDURE — 99204 PR OFFICE/OUTPT VISIT, NEW, LEVL IV, 45-59 MIN: ICD-10-PCS | Mod: HCNC,S$GLB,, | Performed by: STUDENT IN AN ORGANIZED HEALTH CARE EDUCATION/TRAINING PROGRAM

## 2019-02-13 PROCEDURE — 1101F PT FALLS ASSESS-DOCD LE1/YR: CPT | Mod: HCNC,CPTII,S$GLB, | Performed by: STUDENT IN AN ORGANIZED HEALTH CARE EDUCATION/TRAINING PROGRAM

## 2019-02-13 PROCEDURE — 3075F PR MOST RECENT SYSTOLIC BLOOD PRESS GE 130-139MM HG: ICD-10-PCS | Mod: HCNC,CPTII,S$GLB, | Performed by: STUDENT IN AN ORGANIZED HEALTH CARE EDUCATION/TRAINING PROGRAM

## 2019-02-13 PROCEDURE — 3075F SYST BP GE 130 - 139MM HG: CPT | Mod: HCNC,CPTII,S$GLB, | Performed by: STUDENT IN AN ORGANIZED HEALTH CARE EDUCATION/TRAINING PROGRAM

## 2019-02-13 PROCEDURE — 1101F PR PT FALLS ASSESS DOC 0-1 FALLS W/OUT INJ PAST YR: ICD-10-PCS | Mod: HCNC,CPTII,S$GLB, | Performed by: STUDENT IN AN ORGANIZED HEALTH CARE EDUCATION/TRAINING PROGRAM

## 2019-02-13 PROCEDURE — 99999 PR PBB SHADOW E&M-EST. PATIENT-LVL III: ICD-10-PCS | Mod: PBBFAC,HCNC,, | Performed by: STUDENT IN AN ORGANIZED HEALTH CARE EDUCATION/TRAINING PROGRAM

## 2019-02-13 PROCEDURE — 99204 OFFICE O/P NEW MOD 45 MIN: CPT | Mod: HCNC,S$GLB,, | Performed by: STUDENT IN AN ORGANIZED HEALTH CARE EDUCATION/TRAINING PROGRAM

## 2019-02-13 NOTE — LETTER
February 13, 2019      Hugo Lara MD  [address] [facility] [place] [address]           [place] - General Surgery  [address] [facility] [address]  Phone: [phone]          Patient: Joseph Rizvi Jr.   MR Number: [id number]   YOB: 1950   Date of Visit: 2/13/2019       Dear Dr. Hugo Lara:    Thank you for referring Joseph Rizvi to me for evaluation. Attached you will find relevant portions of my assessment and plan of care.    If you have questions, please do not hesitate to call me. I look forward to following Joseph Rizvi along with you.    Sincerely,    Eliseo Thomas MD    Enclosure  CC:  No Recipients    If you would like to receive this communication electronically, please contact [email or web address] or [phone] to request more information on [facility] Link access.    For providers and/or their staff who would like to refer a patient to [facility], please contact us through our one-stop-shop provider referral line, [facility], at [phone].    If you feel you have received this communication in error or would no longer like to receive these types of communications, please e-mail [email or web address]

## 2019-02-13 NOTE — H&P (VIEW-ONLY)
Patient ID: Joseph Rizvi Jr. is a 69 y.o. male.    Chief Complaint: No chief complaint on file.      HPI:  69M presents with swelling right lateral forearm that has been present for about a year and a half. First noticed after he was working with a palm tree and it fell on him, hitting his arm. There has bascially been no change in the past year. Minimal discomfort. No drainage. Denies fevers. Otherwise feeling well.         Review of Systems   Constitutional: Negative for chills, diaphoresis and fever.   HENT: Negative for trouble swallowing.    Respiratory: Negative for cough, shortness of breath, wheezing and stridor.    Cardiovascular: Negative for chest pain and palpitations.   Gastrointestinal: Negative for abdominal distention, abdominal pain, blood in stool, diarrhea, nausea and vomiting.   Endocrine: Negative for cold intolerance and heat intolerance.   Genitourinary: Negative for difficulty urinating.   Musculoskeletal: Positive for joint swelling. Negative for back pain.   Skin: Negative for rash.   Allergic/Immunologic: Negative for immunocompromised state.   Neurological: Negative for dizziness, syncope and numbness.   Hematological: Negative for adenopathy.   Psychiatric/Behavioral: Negative for agitation.       Current Outpatient Medications   Medication Sig Dispense Refill    cyanocobalamin (VITAMIN B-12) 1000 MCG tablet Take 100 mcg by mouth once daily.      ergocalciferol (ERGOCALCIFEROL) 50,000 unit Cap Take 1 capsule (50,000 Units total) by mouth every 7 days. 4 capsule 2    irbesartan (AVAPRO) 150 MG tablet Take 1 tablet (150 mg total) by mouth every evening. 90 tablet 3    vitamin E 200 UNIT capsule Take 200 Units by mouth once daily.       No current facility-administered medications for this visit.        Review of patient's allergies indicates:  No Known Allergies    Past Medical History:   Diagnosis Date    Bell's palsy     Depression     Hypertension     Psoriasis        Past  Surgical History:   Procedure Laterality Date    COLONOSCOPY N/A 10/14/2015    Performed by CYNTHIA Potter MD at [facility] (4TH FLR)    Lipoma removal      groin area        Family History   Problem Relation Age of Onset    Schizophrenia Mother     Heart disease Father     Dementia Father     Cancer Sister         Melanoma    Schizophrenia Brother     Cancer Sister         Melanoma       Social History     Socioeconomic History    Marital status: Single     Spouse name: Not on file    Number of children: Not on file    Years of education: Not on file    Highest education level: Not on file   Social Needs    Financial resource strain: Not on file    Food insecurity - worry: Not on file    Food insecurity - inability: Not on file    Transportation needs - medical: Not on file    Transportation needs - non-medical: Not on file   Occupational History    Occupation:    Tobacco Use    Smoking status: Never Smoker    Smokeless tobacco: Never Used   Substance and Sexual Activity    Alcohol use: Yes     Comment: Social     Drug use: No    Sexual activity: Not on file   Other Topics Concern    Not on file   Social History Narrative    Not on file       Vitals:    02/13/19 0809   BP: 138/72   Pulse: 65   Temp: 98.8 °F (37.1 °C)       Physical Exam   Constitutional: He is oriented to person, place, and time. He appears well-nourished. No distress.   HENT:   Head: Normocephalic and atraumatic.   Eyes: No scleral icterus.   Cardiovascular: Normal rate.   Pulmonary/Chest: Effort normal. No stridor. No respiratory distress.   Abdominal: Soft. There is no tenderness.   Lymphadenopathy:     He has no cervical adenopathy.   Neurological: He is alert and oriented to person, place, and time.   Skin: Skin is warm. No erythema.        Psychiatric: He has a normal mood and affect. His behavior is normal.     US shows foreign body, small area of surrounding fluid    Assessment & Plan:   69M  with palm spines in right forearm. Minimally symptomatic. Discussed process of removing them. Would do this in the OR with US. Since they are stable it is unlikely that they will cause him significant problem. If it does becomes more bothersome in the future he can call us back. Offered to remove spines, as of now he prefers to continue to observe. I think this is reasonable.   Abx not be needed. If he had signs of infection he would just need removal of the FB  RTC prn

## 2019-02-13 NOTE — PROGRESS NOTES
Patient ID: Joseph Rizvi Jr. is a 69 y.o. male.    Chief Complaint: No chief complaint on file.      HPI:  69M presents with swelling right lateral forearm that has been present for about a year and a half. First noticed after he was working with a palm tree and it fell on him, hitting his arm. There has bascially been no change in the past year. Minimal discomfort. No drainage. Denies fevers. Otherwise feeling well.         Review of Systems   Constitutional: Negative for chills, diaphoresis and fever.   HENT: Negative for trouble swallowing.    Respiratory: Negative for cough, shortness of breath, wheezing and stridor.    Cardiovascular: Negative for chest pain and palpitations.   Gastrointestinal: Negative for abdominal distention, abdominal pain, blood in stool, diarrhea, nausea and vomiting.   Endocrine: Negative for cold intolerance and heat intolerance.   Genitourinary: Negative for difficulty urinating.   Musculoskeletal: Positive for joint swelling. Negative for back pain.   Skin: Negative for rash.   Allergic/Immunologic: Negative for immunocompromised state.   Neurological: Negative for dizziness, syncope and numbness.   Hematological: Negative for adenopathy.   Psychiatric/Behavioral: Negative for agitation.       Current Outpatient Medications   Medication Sig Dispense Refill    cyanocobalamin (VITAMIN B-12) 1000 MCG tablet Take 100 mcg by mouth once daily.      ergocalciferol (ERGOCALCIFEROL) 50,000 unit Cap Take 1 capsule (50,000 Units total) by mouth every 7 days. 4 capsule 2    irbesartan (AVAPRO) 150 MG tablet Take 1 tablet (150 mg total) by mouth every evening. 90 tablet 3    vitamin E 200 UNIT capsule Take 200 Units by mouth once daily.       No current facility-administered medications for this visit.        Review of patient's allergies indicates:  No Known Allergies    Past Medical History:   Diagnosis Date    Bell's palsy     Depression     Hypertension     Psoriasis        Past  Surgical History:   Procedure Laterality Date    COLONOSCOPY N/A 10/14/2015    Performed by CYNTHIA Potter MD at [facility] (4TH FLR)    Lipoma removal      groin area        Family History   Problem Relation Age of Onset    Schizophrenia Mother     Heart disease Father     Dementia Father     Cancer Sister         Melanoma    Schizophrenia Brother     Cancer Sister         Melanoma       Social History     Socioeconomic History    Marital status: Single     Spouse name: Not on file    Number of children: Not on file    Years of education: Not on file    Highest education level: Not on file   Social Needs    Financial resource strain: Not on file    Food insecurity - worry: Not on file    Food insecurity - inability: Not on file    Transportation needs - medical: Not on file    Transportation needs - non-medical: Not on file   Occupational History    Occupation:    Tobacco Use    Smoking status: Never Smoker    Smokeless tobacco: Never Used   Substance and Sexual Activity    Alcohol use: Yes     Comment: Social     Drug use: No    Sexual activity: Not on file   Other Topics Concern    Not on file   Social History Narrative    Not on file       Vitals:    02/13/19 0809   BP: 138/72   Pulse: 65   Temp: 98.8 °F (37.1 °C)       Physical Exam   Constitutional: He is oriented to person, place, and time. He appears well-nourished. No distress.   HENT:   Head: Normocephalic and atraumatic.   Eyes: No scleral icterus.   Cardiovascular: Normal rate.   Pulmonary/Chest: Effort normal. No stridor. No respiratory distress.   Abdominal: Soft. There is no tenderness.   Lymphadenopathy:     He has no cervical adenopathy.   Neurological: He is alert and oriented to person, place, and time.   Skin: Skin is warm. No erythema.        Psychiatric: He has a normal mood and affect. His behavior is normal.     US shows foreign body, small area of surrounding fluid    Assessment & Plan:   69M  with palm spines in right forearm. Minimally symptomatic. Discussed process of removing them. Would do this in the OR with US. Since they are stable it is unlikely that they will cause him significant problem. If it does becomes more bothersome in the future he can call us back. Offered to remove spines, as of now he prefers to continue to observe. I think this is reasonable.   Abx not be needed. If he had signs of infection he would just need removal of the FB  RTC prn

## 2019-02-22 ENCOUNTER — ANESTHESIA EVENT (OUTPATIENT)
Dept: SURGERY | Facility: HOSPITAL | Age: 69
End: 2019-02-22
Payer: MEDICARE

## 2019-02-22 ENCOUNTER — HOSPITAL ENCOUNTER (OUTPATIENT)
Dept: PREADMISSION TESTING | Facility: HOSPITAL | Age: 69
Discharge: HOME | End: 2019-02-22
Attending: STUDENT IN AN ORGANIZED HEALTH CARE EDUCATION/TRAINING PROGRAM
Payer: MEDICARE

## 2019-02-22 ENCOUNTER — CLINICAL SUPPORT (OUTPATIENT)
Dept: LAB | Facility: HOSPITAL | Age: 69
End: 2019-02-22
Attending: STUDENT IN AN ORGANIZED HEALTH CARE EDUCATION/TRAINING PROGRAM
Payer: MEDICARE

## 2019-02-22 DIAGNOSIS — S40.851A: ICD-10-CM

## 2019-02-22 PROCEDURE — 93005 ELECTROCARDIOGRAM TRACING: CPT | Mod: HCNC

## 2019-02-22 RX ORDER — SODIUM CHLORIDE, SODIUM LACTATE, POTASSIUM CHLORIDE, CALCIUM CHLORIDE 600; 310; 30; 20 MG/100ML; MG/100ML; MG/100ML; MG/100ML
INJECTION, SOLUTION INTRAVENOUS CONTINUOUS
Status: CANCELLED | OUTPATIENT
Start: 2019-02-22

## 2019-02-22 RX ORDER — LIDOCAINE HYDROCHLORIDE 10 MG/ML
1 INJECTION, SOLUTION EPIDURAL; INFILTRATION; INTRACAUDAL; PERINEURAL ONCE
Status: CANCELLED | OUTPATIENT
Start: 2019-02-22 | End: 2019-02-22

## 2019-02-22 NOTE — ANESTHESIA PREPROCEDURE EVALUATION
02/22/2019  Joseph Rizvi Jr. is a 69 y.o., male scheduled for removal of foreign body from right forearm under local/MAC on 2/28/19.    Past Medical History:   Diagnosis Date    Bell's palsy     Depression     Hypertension     Psoriasis      Past Surgical History:   Procedure Laterality Date    COLONOSCOPY N/A 10/14/2015    Performed by CYNTHIA Potter MD at [facility] (4TH FLR)    Lipoma removal      groin area        Anesthesia Evaluation    I have reviewed the Patient Summary Reports.     I have reviewed the Medications.     Review of Systems  Anesthesia Hx:  No problems with previous Anesthesia  Denies Family Hx of Anesthesia complications.    Social:  Non-Smoker, Social Alcohol Use    Hematology/Oncology:  Hematology Normal        Cardiovascular:   Exercise tolerance: good Hypertension, well controlled  Denies Angina.        Pulmonary:  Pulmonary Normal  Denies Shortness of breath.    Renal/:  Renal/ Normal     Hepatic/GI:  Hepatic/GI Normal  Denies GERD. Denies Liver Disease.    Neurological:  Neurology Normal Denies TIA.  Denies CVA. Denies Seizures.    Endocrine:  Endocrine Normal        Physical Exam  General:  Well nourished    Airway/Jaw/Neck:  Airway Findings: Mouth Opening: Normal Tongue: Normal  General Airway Assessment: Adult         Dental:  DENTAL FINDINGS: Normal   Chest/Lungs:  Chest/Lungs Findings: Clear to auscultation, Normal Respiratory Rate     Heart/Vascular:  Heart Findings: Rate: Normal  Rhythm: Regular Rhythm  Sounds: Normal  Heart murmur: negative       Mental Status:  Mental Status Findings:  Cooperative, Alert and Oriented       Echo 8/3/16:  CONCLUSIONS     1 - Normal left ventricular systolic function (EF 60-65%).     2 - Normal left ventricular diastolic function.     3 - Biatrial enlargement.     4 - Normal right ventricular systolic function     5 - The  estimated PA systolic pressure is 25 mmHg.     6 - Trivial aortic regurgitation.     7 - Mild tricuspid regurgitation.       Anesthesia Plan  Type of Anesthesia, risks & benefits discussed:  Anesthesia Type:  MAC  Patient's Preference:   Intra-op Monitoring Plan: standard ASA monitors  Intra-op Monitoring Plan Comments:   Post Op Pain Control Plan: per primary service following discharge from PACU  Post Op Pain Control Plan Comments:   Induction:   IV  Beta Blocker:  Patient is not currently on a Beta-Blocker (No further documentation required).       Informed Consent: Patient understands risks and agrees with Anesthesia plan.  Questions answered. Anesthesia consent signed with patient.  ASA Score: 2     Day of Surgery Review of History & Physical:        Anesthesia Plan Notes: Anesthesia consent to be signed prior to procedure on 2/28/19          Ready For Surgery From Anesthesia Perspective.

## 2019-02-22 NOTE — PRE-PROCEDURE INSTRUCTIONS
Jak Warner  [phone] - Roommate    Allergies, medical, surgical, family and psychosocial histories reviewed with patient. Periop plan of care reviewed. Preop instructions given, including medications to take and to hold. Hibiclens soap and instructions on use given. Time allotted for questions to be addressed.  Patient verbalized understanding.

## 2019-02-22 NOTE — DISCHARGE INSTRUCTIONS
Your surgery is scheduled for 2/28/19.    Please report to Outpatient Surgery Intake Office on the 2nd FLOOR at 5:30 a.m.          INSTRUCTIONS IMPORTANT!!!  ¨ Do not eat or drink after 12 midnight-including water. OK to brush teeth, no   gum, candy or mints!    ¨ Take only these medicines with a small swallow of water-morning of surgery: Irbesartan        ____  Proceed to [facility] on 2/22/19 for additional blood test.        ____  No powder, lotions or creams to surgical area.  ____  Please remove all jewelry, including piercings and leave at home.  ____  No money or valuables needed. Please leave at home.  ____  Please bring any documents given by your doctor.  ____  If going home the same day, arrange for a ride home. You will not be able to             drive if Anesthesia was used.  ____  Wear loose fitting clothing. Allow for dressings, bandages.  ____  Stop Aspirin, Ibuprofen, Motrin and Aleve at least 3-5 days before surgery, unless otherwise instructed by your doctor, or the nurse.   You MAY use Tylenol/acetaminophen until day of surgery.  ____  Wash the surgical area with Hibiclens the night before surgery, and again the             morning of surgery.  Be sure to rinse hibiclens off completely (if instructed by   nurse).  ____  If you take diabetic medication, do not take am of surgery unless instructed by Doctor.  ____  Call MD for temperature above 101 degrees or any other signs of infection such as Urinary (bladder) infection, Upper respiratory infection, skin boils, etc.  ____ Stop taking any Fish Oil supplement or any Vitamins that contain Vitamin E at least 5 days prior to surgery.  ____ Do Not wear your contact lenses the day of your procedure.  You may wear your glasses.      ____Do not shave surgical site for 3 days prior to surgery.  ____ Practice Good hand washing before, during, and after procedure.      I have read or had read and explained to me, and understand the above  information.  Additional comments or instructions:  For additional questions call [phone]      ANESTHESIA SIDE EFFECTS  -For the first 24 hours after surgery:  Do not drive, use heavy equipment, make important decisions, or drink alcohol  -It is normal to feel sleepy for several hours.  Rest until you are more awake.  -Have someone stay with you, if needed.  They can watch for problems and help keep you safe.  -Some possible post anesthesia side effects include: nausea and vomiting, sore throat and hoarseness, sleepiness, and dizziness.        Pre-Op Bathing Instructions    Before surgery, you can play an important role in your own health.    Because skin is not sterile, we need to be sure that your skin is as free of germs as possible. By following the instructions below, you can reduce the number of germs on your skin before surgery.    IMPORTANT: You will need to shower with a special soap called Hibiclens*, available at any pharmacy.  If you are allergic to Chlorhexidine (the antiseptic in Hibiclens), use an antibacterial soap such as Dial Soap for your preoperative shower.  You will shower with Hibiclens both the night before your surgery and the morning of your surgery.  Do not use Hibiclens on the head, face or genitals to avoid injury to those areas.    STEP #1: THE NIGHT BEFORE YOUR SURGERY     1. Do not shave the area of your body where your surgery will be performed.  2. Shower and wash your hair and body as usual with your normal soap and shampoo.  3. Rinse your hair and body thoroughly after you shower to remove all soap residue.  4. With your hand, apply one packet of Hibiclens soap to the surgical site.   5. Wash the site gently for five (5) minutes. Do not scrub your skin too hard.   6. Do not wash with your regular soap after Hibiclens is used.  7. Rinse your body thoroughly.  8. Pat yourself dry with a clean, soft towel.  9. Do not use lotion, cream, or powder.  10. Wear clean clothes.    STEP #2:  THE MORNING OF YOUR SURGERY     1. Repeat Step #1.    * Not to be used by people allergic to Chlorhexidine.

## 2019-02-28 ENCOUNTER — HOSPITAL ENCOUNTER (OUTPATIENT)
Facility: HOSPITAL | Age: 69
Discharge: HOME | End: 2019-02-28
Attending: STUDENT IN AN ORGANIZED HEALTH CARE EDUCATION/TRAINING PROGRAM | Admitting: STUDENT IN AN ORGANIZED HEALTH CARE EDUCATION/TRAINING PROGRAM
Payer: MEDICARE

## 2019-02-28 ENCOUNTER — ANESTHESIA (OUTPATIENT)
Dept: SURGERY | Facility: HOSPITAL | Age: 69
End: 2019-02-28
Payer: MEDICARE

## 2019-02-28 VITALS
WEIGHT: 168 LBS | TEMPERATURE: 98 F | SYSTOLIC BLOOD PRESSURE: 148 MMHG | OXYGEN SATURATION: 99 % | BODY MASS INDEX: 24.88 KG/M2 | RESPIRATION RATE: 18 BRPM | HEART RATE: 69 BPM | HEIGHT: 69 IN | DIASTOLIC BLOOD PRESSURE: 73 MMHG

## 2019-02-28 DIAGNOSIS — S40.851A: Primary | ICD-10-CM

## 2019-02-28 PROCEDURE — 71000016 HC POSTOP RECOV ADDL HR: Mod: HCNC | Performed by: STUDENT IN AN ORGANIZED HEALTH CARE EDUCATION/TRAINING PROGRAM

## 2019-02-28 PROCEDURE — 10120 INC&RMVL FB SUBQ TISS SMPL: CPT | Mod: HCNC,,, | Performed by: STUDENT IN AN ORGANIZED HEALTH CARE EDUCATION/TRAINING PROGRAM

## 2019-02-28 PROCEDURE — 36000706: Mod: HCNC | Performed by: STUDENT IN AN ORGANIZED HEALTH CARE EDUCATION/TRAINING PROGRAM

## 2019-02-28 PROCEDURE — 25000003 PHARM REV CODE 250: Mod: HCNC | Performed by: NURSE PRACTITIONER

## 2019-02-28 PROCEDURE — 25000003 PHARM REV CODE 250: Mod: HCNC | Performed by: STUDENT IN AN ORGANIZED HEALTH CARE EDUCATION/TRAINING PROGRAM

## 2019-02-28 PROCEDURE — 71000015 HC POSTOP RECOV 1ST HR: Mod: HCNC | Performed by: STUDENT IN AN ORGANIZED HEALTH CARE EDUCATION/TRAINING PROGRAM

## 2019-02-28 PROCEDURE — 63600175 PHARM REV CODE 636 W HCPCS: Mod: HCNC | Performed by: STUDENT IN AN ORGANIZED HEALTH CARE EDUCATION/TRAINING PROGRAM

## 2019-02-28 PROCEDURE — 10120 PR REMOVE FOREIGN BODY SIMPLE: ICD-10-PCS | Mod: HCNC,,, | Performed by: STUDENT IN AN ORGANIZED HEALTH CARE EDUCATION/TRAINING PROGRAM

## 2019-02-28 PROCEDURE — 37000009 HC ANESTHESIA EA ADD 15 MINS: Mod: HCNC | Performed by: STUDENT IN AN ORGANIZED HEALTH CARE EDUCATION/TRAINING PROGRAM

## 2019-02-28 PROCEDURE — 37000008 HC ANESTHESIA 1ST 15 MINUTES: Mod: HCNC | Performed by: STUDENT IN AN ORGANIZED HEALTH CARE EDUCATION/TRAINING PROGRAM

## 2019-02-28 PROCEDURE — 36000707: Mod: HCNC | Performed by: STUDENT IN AN ORGANIZED HEALTH CARE EDUCATION/TRAINING PROGRAM

## 2019-02-28 RX ORDER — HYDROMORPHONE HYDROCHLORIDE 2 MG/ML
0.2 INJECTION, SOLUTION INTRAMUSCULAR; INTRAVENOUS; SUBCUTANEOUS EVERY 5 MIN PRN
Status: CANCELLED | OUTPATIENT
Start: 2019-02-28

## 2019-02-28 RX ORDER — LIDOCAINE HCL/PF 100 MG/5ML
SYRINGE (ML) INTRAVENOUS
Status: DISCONTINUED | OUTPATIENT
Start: 2019-02-28 | End: 2019-02-28

## 2019-02-28 RX ORDER — ONDANSETRON 2 MG/ML
INJECTION INTRAMUSCULAR; INTRAVENOUS
Status: DISCONTINUED | OUTPATIENT
Start: 2019-02-28 | End: 2019-02-28

## 2019-02-28 RX ORDER — HYDROCODONE BITARTRATE AND ACETAMINOPHEN 5; 325 MG/1; MG/1
1 TABLET ORAL EVERY 6 HOURS PRN
Qty: 10 TABLET | Refills: 0 | Status: SHIPPED | OUTPATIENT
Start: 2019-02-28 | End: 2020-04-16

## 2019-02-28 RX ORDER — GLYCOPYRROLATE 0.2 MG/ML
INJECTION INTRAMUSCULAR; INTRAVENOUS
Status: DISCONTINUED | OUTPATIENT
Start: 2019-02-28 | End: 2019-02-28

## 2019-02-28 RX ORDER — SODIUM CHLORIDE, SODIUM LACTATE, POTASSIUM CHLORIDE, CALCIUM CHLORIDE 600; 310; 30; 20 MG/100ML; MG/100ML; MG/100ML; MG/100ML
INJECTION, SOLUTION INTRAVENOUS CONTINUOUS
Status: DISCONTINUED | OUTPATIENT
Start: 2019-02-28 | End: 2019-02-28 | Stop reason: HOSPADM

## 2019-02-28 RX ORDER — OXYCODONE HYDROCHLORIDE 5 MG/1
5 TABLET ORAL
Status: CANCELLED | OUTPATIENT
Start: 2019-02-28

## 2019-02-28 RX ORDER — LIDOCAINE HYDROCHLORIDE 10 MG/ML
INJECTION, SOLUTION EPIDURAL; INFILTRATION; INTRACAUDAL; PERINEURAL
Status: DISCONTINUED | OUTPATIENT
Start: 2019-02-28 | End: 2019-02-28 | Stop reason: HOSPADM

## 2019-02-28 RX ORDER — BUPIVACAINE HYDROCHLORIDE 2.5 MG/ML
INJECTION, SOLUTION EPIDURAL; INFILTRATION; INTRACAUDAL
Status: DISCONTINUED | OUTPATIENT
Start: 2019-02-28 | End: 2019-02-28 | Stop reason: HOSPADM

## 2019-02-28 RX ORDER — PROPOFOL 10 MG/ML
VIAL (ML) INTRAVENOUS CONTINUOUS PRN
Status: DISCONTINUED | OUTPATIENT
Start: 2019-02-28 | End: 2019-02-28

## 2019-02-28 RX ORDER — MIDAZOLAM HYDROCHLORIDE 1 MG/ML
INJECTION, SOLUTION INTRAMUSCULAR; INTRAVENOUS
Status: DISCONTINUED | OUTPATIENT
Start: 2019-02-28 | End: 2019-02-28

## 2019-02-28 RX ORDER — DIPHENHYDRAMINE HYDROCHLORIDE 50 MG/ML
25 INJECTION INTRAMUSCULAR; INTRAVENOUS EVERY 6 HOURS PRN
Status: CANCELLED | OUTPATIENT
Start: 2019-02-28

## 2019-02-28 RX ORDER — OXYCODONE AND ACETAMINOPHEN 5; 325 MG/1; MG/1
1 TABLET ORAL
Status: CANCELLED | OUTPATIENT
Start: 2019-02-28

## 2019-02-28 RX ORDER — SODIUM CHLORIDE 0.9 % (FLUSH) 0.9 %
3 SYRINGE (ML) INJECTION
Status: CANCELLED | OUTPATIENT
Start: 2019-02-28

## 2019-02-28 RX ORDER — CEFAZOLIN SODIUM 1 G/3ML
INJECTION, POWDER, FOR SOLUTION INTRAMUSCULAR; INTRAVENOUS
Status: DISCONTINUED | OUTPATIENT
Start: 2019-02-28 | End: 2019-02-28

## 2019-02-28 RX ORDER — PROPOFOL 10 MG/ML
VIAL (ML) INTRAVENOUS
Status: DISCONTINUED | OUTPATIENT
Start: 2019-02-28 | End: 2019-02-28

## 2019-02-28 RX ORDER — CEFAZOLIN SODIUM 2 G/50ML
2 SOLUTION INTRAVENOUS
Status: DISCONTINUED | OUTPATIENT
Start: 2019-02-28 | End: 2019-02-28 | Stop reason: HOSPADM

## 2019-02-28 RX ORDER — HYDROMORPHONE HYDROCHLORIDE 2 MG/ML
0.5 INJECTION, SOLUTION INTRAMUSCULAR; INTRAVENOUS; SUBCUTANEOUS EVERY 5 MIN PRN
Status: CANCELLED | OUTPATIENT
Start: 2019-02-28

## 2019-02-28 RX ORDER — HYDROCODONE BITARTRATE AND ACETAMINOPHEN 5; 325 MG/1; MG/1
1 TABLET ORAL EVERY 4 HOURS PRN
Status: DISCONTINUED | OUTPATIENT
Start: 2019-02-28 | End: 2019-02-28 | Stop reason: HOSPADM

## 2019-02-28 RX ORDER — SODIUM CHLORIDE 0.9 % (FLUSH) 0.9 %
3 SYRINGE (ML) INJECTION EVERY 8 HOURS
Status: CANCELLED | OUTPATIENT
Start: 2019-02-28

## 2019-02-28 RX ORDER — LIDOCAINE HYDROCHLORIDE 10 MG/ML
1 INJECTION, SOLUTION EPIDURAL; INFILTRATION; INTRACAUDAL; PERINEURAL ONCE
Status: DISCONTINUED | OUTPATIENT
Start: 2019-02-28 | End: 2019-02-28 | Stop reason: HOSPADM

## 2019-02-28 RX ADMIN — SODIUM CHLORIDE, SODIUM LACTATE, POTASSIUM CHLORIDE, AND CALCIUM CHLORIDE: .6; .31; .03; .02 INJECTION, SOLUTION INTRAVENOUS at 06:02

## 2019-02-28 RX ADMIN — SODIUM CHLORIDE, SODIUM LACTATE, POTASSIUM CHLORIDE, AND CALCIUM CHLORIDE: .6; .31; .03; .02 INJECTION, SOLUTION INTRAVENOUS at 07:02

## 2019-02-28 RX ADMIN — PROPOFOL 70 MG: 10 INJECTION, EMULSION INTRAVENOUS at 07:02

## 2019-02-28 RX ADMIN — GLYCOPYRROLATE 0.2 MG: 0.2 INJECTION, SOLUTION INTRAMUSCULAR; INTRAVENOUS at 07:02

## 2019-02-28 RX ADMIN — MIDAZOLAM 2 MG: 1 INJECTION INTRAMUSCULAR; INTRAVENOUS at 07:02

## 2019-02-28 RX ADMIN — HYDROCODONE BITARTRATE AND ACETAMINOPHEN 1 TABLET: 5; 325 TABLET ORAL at 09:02

## 2019-02-28 RX ADMIN — PROPOFOL 100 MCG/KG/MIN: 10 INJECTION, EMULSION INTRAVENOUS at 07:02

## 2019-02-28 RX ADMIN — LIDOCAINE HYDROCHLORIDE 100 MG: 20 INJECTION, SOLUTION INTRAVENOUS at 07:02

## 2019-02-28 RX ADMIN — ONDANSETRON 4 MG: 2 INJECTION, SOLUTION INTRAMUSCULAR; INTRAVENOUS at 07:02

## 2019-02-28 RX ADMIN — PROPOFOL 20 MG: 10 INJECTION, EMULSION INTRAVENOUS at 07:02

## 2019-02-28 RX ADMIN — CEFAZOLIN 2 G: 330 INJECTION, POWDER, FOR SOLUTION INTRAMUSCULAR; INTRAVENOUS at 07:02

## 2019-02-28 NOTE — DISCHARGE INSTRUCTIONS
ANESTHESIA  -For the first 24 hours after surgery:  Do not drive, use heavy equipment, make important decisions, or drink alcohol  -It is normal to feel sleepy for several hours.  Rest until you are more awake.  -Have someone stay with you, if needed.  They can watch for problems and help keep you safe.  -Some possible post anesthesia side effects include: nausea and vomiting, sore throat and hoarseness, sleepiness, and dizziness.    PAIN  -If you have pain after surgery, pain medicine will help you feel better.  Take it as directed, before pain becomes severe.  Most pain relievers taken by mouth need at least 20-30 minutes to start working.  -Do not drive or drink alcohol while taking pain medicine.  -Pain medication can upset your stomach.  Taking them with a little food may help.  -Other ways to help control pain: elevation, ice, and relaxation  -Call your surgeon if still having unmanageable pain an hour after taking pain medicine.  -Pain medicine can cause constipation.  Taking an over-the counter stool softener while on prescription pain medicine and drinking plenty of fluids can prevent this side effect.  -Call your surgeon if you have severe side effects like: breathing problems, trouble waking up, dizziness, confusion, or severe constipation.    NAUSEA  -Some people have nausea after surgery.  This is often because of anesthesia, pain, pain medicine, or the stress of surgery.  -Do not push yourself to eat.  Start off with clear liquids and soup.  Slowly move to solid foods.  Don't eat fatty, rich, spicy foods at first.  Eat smaller amounts.  -If you develop persistent nausea and vomiting please notify your surgeon immediately.    BLEEDING  -Different types of surgery require different types of care and dressing changes.  It is important to follow all instructions and advice from your surgeon.  Change dressing as directed.  Call your surgeon for any concerns regarding postop bleeding.    SIGNS OF  INFECTION  -Signs of infection include: fever, swelling, drainage, and redness  -Notify your surgeon if you have a fever of 100.4 F (38.0 C) or higher.  -Notify your surgeon if you notice redness, swelling, increased pain, pus, or a foul smell at the incision site.          Acetaminophen; Hydrocodone tablets or capsules  What is this medicine?  ACETAMINOPHEN; HYDROCODONE (a set a JAMES alka fen; grant droe KOE done) is a pain reliever. It is used to treat moderate to severe pain.  How should I use this medicine?  Take this medicine by mouth with a glass of water. Follow the directions on the prescription label. You can take it with or without food. If it upsets your stomach, take it with food. Do not take your medicine more often than directed.  A special MedGuide will be given to you by the pharmacist with each prescription and refill. Be sure to read this information carefully each time.  Talk to your pediatrician regarding the use of this medicine in children. Special care may be needed.  What side effects may I notice from receiving this medicine?  Side effects that you should report to your doctor or health care professional as soon as possible:  · allergic reactions like skin rash, itching or hives, swelling of the face, lips, or tongue  · breathing problems  · confusion  · redness, blistering, peeling or loosening of the skin, including inside the mouth  · signs and symptoms of low blood pressure like dizziness; feeling faint or lightheaded, falls; unusually weak or tired  · trouble passing urine or change in the amount of urine  · yellowing of the eyes or skin  Side effects that usually do not require medical attention (report to your doctor or health care professional if they continue or are bothersome):  · constipation  · dry mouth  · nausea, vomiting  · tiredness  What may interact with this medicine?  This medicine may interact with the following medications:  · alcohol  · antiviral medicines for HIV or  AIDS  · atropine  · antihistamines for allergy, cough and cold  · certain antibiotics like erythromycin, clarithromycin  · certain medicines for anxiety or sleep  · certain medicines for bladder problems like oxybutynin, tolterodine  · certain medicines for depression like amitriptyline, fluoxetine, sertraline  · certain medicines for fungal infections like ketoconazole and itraconazole  · certain medicines for Parkinson's disease like benztropine, trihexyphenidyl  · certain medicines for seizures like carbamazepine, phenobarbital, phenytoin, primidone  · certain medicines for stomach problems like dicyclomine, hyoscyamine  · certain medicines for travel sickness like scopolamine  · general anesthetics like halothane, isoflurane, methoxyflurane, propofol  · ipratropium  · local anesthetics like lidocaine, pramoxine, tetracaine  · MAOIs like Carbex, Eldepryl, Marplan, Nardil, and Parnate  · medicines that relax muscles for surgery  · other medicines with acetaminophen  · other narcotic medicines for pain or cough  · phenothiazines like chlorpromazine, mesoridazine, prochlorperazine, thioridazine  · rifampin  What if I miss a dose?  If you miss a dose, take it as soon as you can. If it is almost time for your next dose, take only that dose. Do not take double or extra doses.  Where should I keep my medicine?  Keep out of the reach of children. This medicine can be abused. Keep your medicine in a safe place to protect it from theft. Do not share this medicine with anyone. Selling or giving away this medicine is dangerous and against the law.  This medicine may cause accidental overdose and death if it taken by other adults, children, or pets. Mix any unused medicine with a substance like cat litter or coffee grounds. Then throw the medicine away in a sealed container like a sealed bag or a coffee can with a lid. Do not use the medicine after the expiration date.  Store at room temperature between 15 and 30 degrees C  (59 and 86 degrees F).  What should I tell my health care provider before I take this medicine?  They need to know if you have any of these conditions:  · brain tumor  · Crohn's disease, inflammatory bowel disease, or ulcerative colitis  · drug abuse or addiction  · head injury  · heart or circulation problems  · if you often drink alcohol  · kidney disease or problems going to the bathroom  · liver disease  · lung disease, asthma, or breathing problems  · an unusual or allergic reaction to acetaminophen, hydrocodone, other opioid analgesics, other medicines, foods, dyes, or preservatives  · pregnant or trying to get pregnant  · breast-feeding  What should I watch for while using this medicine?  Tell your doctor or health care professional if your pain does not go away, if it gets worse, or if you have new or a different type of pain. You may develop tolerance to the medicine. Tolerance means that you will need a higher dose of the medicine for pain relief. Tolerance is normal and is expected if you take the medicine for a long time.  Do not suddenly stop taking your medicine because you may develop a severe reaction. Your body becomes used to the medicine. This does NOT mean you are addicted. Addiction is a behavior related to getting and using a drug for a non-medical reason. If you have pain, you have a medical reason to take pain medicine. Your doctor will tell you how much medicine to take. If your doctor wants you to stop the medicine, the dose will be slowly lowered over time to avoid any side effects.  There are different types of narcotic medicines (opiates). If you take more than one type at the same time or if you are taking another medicine that also causes drowsiness, you may have more side effects. Give your health care provider a list of all medicines you use. Your doctor will tell you how much medicine to take. Do not take more medicine than directed. Call emergency for help if you have problems  breathing or unusual sleepiness.  Do not take other medicines that contain acetaminophen with this medicine. Always read labels carefully. If you have questions, ask your doctor or pharmacist.  If you take too much acetaminophen get medical help right away. Too much acetaminophen can be very dangerous and cause liver damage. Even if you do not have symptoms, it is important to get help right away.  You may get drowsy or dizzy. Do not drive, use machinery, or do anything that needs mental alertness until you know how this medicine affects you. Do not stand or sit up quickly, especially if you are an older patient. This reduces the risk of dizzy or fainting spells. Alcohol may interfere with the effect of this medicine. Avoid alcoholic drinks.  The medicine will cause constipation. Try to have a bowel movement at least every 2 to 3 days. If you do not have a bowel movement for 3 days, call your doctor or health care professional.  Your mouth may get dry. Chewing sugarless gum or sucking hard candy, and drinking plenty of water may help. Contact your doctor if the problem does not go away or is severe.  NOTE:This sheet is a summary. It may not cover all possible information. If you have questions about this medicine, talk to your doctor, pharmacist, or health care provider. Copyright© 2017 [employer]

## 2019-02-28 NOTE — PLAN OF CARE
Pt stable pain at 0, pt ride notified twice that patient was ready by nurse and charge nurse. Pt ride stated he will be here in 45 mins. Will continue to monitor.

## 2019-02-28 NOTE — PLAN OF CARE
Pt aaox3, vss, surgical site cdi.Given discharged instructions and prescriptions, reviewed with pt and family, questions encouraged, understanding verbalized. Pt vss, surgical site cdi, iv discontinued, iv cath intact, guaze with pressure and dressing applied. Pt tolerated fluids well. Pt dressed and ready to go home.

## 2019-02-28 NOTE — TRANSFER OF CARE
"Anesthesia Transfer of Care Note    Patient: Joseph Rizvi Jr.    Procedure(s) Performed: Procedure(s) (LRB):  REMOVAL, FOREIGN BODY, UPPER EXTREMITY (Right)    Patient location: OPS    Anesthesia Type: general and MAC    Transport from OR: Transported from OR on 6-10 L/min O2 by face mask with adequate spontaneous ventilation    Post pain: adequate analgesia    Post assessment: no apparent anesthetic complications    Post vital signs: stable    Level of consciousness: awake    Nausea/Vomiting: no nausea/vomiting    Complications: none    Transfer of care protocol was followed      Last vitals:   Visit Vitals  /70   Pulse 71   Temp 98.4   Resp 18   Ht 5' 9" (1.753 m)   Wt 76.2 kg (168 lb)   SpO2 100%   BMI 24.81 kg/m²     "

## 2019-02-28 NOTE — ANESTHESIA POSTPROCEDURE EVALUATION
"Anesthesia Post Evaluation    Patient: Joseph Rizvi Jr.    Procedure(s) Performed: Procedure(s) (LRB):  REMOVAL, FOREIGN BODY, UPPER EXTREMITY (Right)    Final Anesthesia Type: MAC  Patient location during evaluation: [facility]  Patient participation: Yes- Able to Participate  Level of consciousness: awake and alert  Post-procedure vital signs: reviewed and stable  Pain management: adequate  Airway patency: patent  PONV status at discharge: No PONV  Anesthetic complications: no      Cardiovascular status: blood pressure returned to baseline  Respiratory status: unassisted  Hydration status: euvolemic  Follow-up not needed.        Visit Vitals  BP (!) 183/81   Pulse 78   Temp 37.2 °C (99 °F) (Temporal)   Resp 18   Ht 5' 9" (1.753 m)   Wt 76.2 kg (168 lb)   SpO2 98%   BMI 24.81 kg/m²       Pain/Fran Score: No Data Recorded      "

## 2019-02-28 NOTE — OP NOTE
[facility]  General Surgery  Operative Note    SUMMARY     Date of Procedure: 2/28/2019     Procedure: Procedure(s) (LRB):  REMOVAL, FOREIGN BODY, UPPER EXTREMITY (Right)       Surgeon(s) and Role:     * Eliseo Thomas MD - Primary    Assisting Surgeon: None    Pre-Operative Diagnosis: Superficial foreign body upper arm without major opn wnd, no infection, right, initial encounter [S40.851A]    Post-Operative Diagnosis: Post-Op Diagnosis Codes:     * Superficial foreign body upper arm without major opn wnd, no infection, right, initial encounter [S40.851A]    Anesthesia: Local MAC    Technical Procedures Used: Brought into OR and placed supine. US was used to identify the foreign body over the area of swelling in the right arm. A longitudinal incision was made and the subcutaenous tissue was dissect down. We then entered a small fluid filled cavity lined with chronic granulation tissue. This was probed digitally and a foreign body was felt. This was grasped with adsons and removed. It was consistent with a 2cm palm spine. The wound was irrigated. US was again applied. No other foreign bodies were seen on US or palpated. The wound was left open and reapproximated with steri strips. Sterile gauze was applied.    Description of the Findings of the Procedure: 2cm palm spine    Significant Surgical Tasks Conducted by the Assistant(s), if Applicable:     Complications: No    Estimated Blood Loss (EBL): 5mL           Implants: * No implants in log *    Specimens:   Specimen (12h ago, onward)    None                  Condition: Stable    Disposition: PACU - hemodynamically stable.    Attestation: I was present and scrubbed for the entire procedure.

## 2019-02-28 NOTE — INTERVAL H&P NOTE
The patient has been examined and the H&P has been reviewed:    I concur with the findings and no changes have occurred since H&P was written.    Anesthesia/Surgery risks, benefits and alternative options discussed and understood by patient/family.          Active Hospital Problems    Diagnosis  POA    Superficial foreign body upper arm without major opn wnd, no infection, right, initial encounter [S44.778B]  Yes      Resolved Hospital Problems   No resolved problems to display.

## 2019-02-28 NOTE — PLAN OF CARE
Pt aaox3, vss, pt denies pain. Pt status updated and reviewed with pt and pt's family/friend. Pt and pt's family/friend verbalize understanding. Safety precautions maintained. Call bell in reach.  Bed locked and in lowest position. Instructed pt to call for assistance. Pt verbalizes understanding.

## 2019-03-06 NOTE — DISCHARGE SUMMARY
[facility]  Short Stay  Discharge Summary    Admit Date: 2/28/2019    Discharge Date and Time: 2/28/2019 10:40 AM      Discharge Attending Physician: No att. providers found     Hospital Course (synopsis of major diagnoses, care, treatment, and services provided during the course of the hospital stay): Patient brought in for elective surgery. Underwent procedure without complication and was discharged.       Final Diagnoses:    Principal Problem: Superficial foreign body upper arm without major opn wnd, no infection, right, initial encounter   Secondary Diagnoses:   Active Hospital Problems    Diagnosis  POA    *Superficial foreign body upper arm without major opn wnd, no infection, right, initial encounter [S40.851A]  Yes      Resolved Hospital Problems   No resolved problems to display.       Discharged Condition: stable    Disposition: Home or Self Care    Follow up/Patient Instructions:    Medications:  Reconciled Home Medications:      Medication List      START taking these medications    HYDROcodone-acetaminophen 5-325 mg per tablet  Commonly known as:  NORCO  Take 1 tablet by mouth every 6 (six) hours as needed for Pain.        CONTINUE taking these medications    ergocalciferol 50,000 unit Cap  Commonly known as:  ERGOCALCIFEROL  Take 1 capsule (50,000 Units total) by mouth every 7 days.     irbesartan 150 MG tablet  Commonly known as:  AVAPRO  Take 1 tablet (150 mg total) by mouth every evening.     VITAMIN B-12 1000 MCG tablet  Generic drug:  cyanocobalamin  Take 100 mcg by mouth once daily.     vitamin E 200 UNIT capsule  Take 200 Units by mouth once daily.          Discharge Procedure Orders   Diet general     Call MD for:  severe uncontrolled pain     Call MD for:  persistent nausea and vomiting     Call MD for:  temperature >100.4     Remove dressing in 48 hours     Activity as tolerated     Shower on day dressing removed (No bath)

## 2019-03-13 ENCOUNTER — OFFICE VISIT (OUTPATIENT)
Dept: SURGERY | Facility: CLINIC | Age: 69
End: 2019-03-13
Payer: MEDICARE

## 2019-03-13 VITALS
OXYGEN SATURATION: 98 % | WEIGHT: 164.44 LBS | HEIGHT: 69 IN | HEART RATE: 59 BPM | DIASTOLIC BLOOD PRESSURE: 80 MMHG | SYSTOLIC BLOOD PRESSURE: 138 MMHG | BODY MASS INDEX: 24.36 KG/M2

## 2019-03-13 DIAGNOSIS — S40.851A: Primary | ICD-10-CM

## 2019-03-13 PROCEDURE — 99999 PR PBB SHADOW E&M-EST. PATIENT-LVL III: CPT | Mod: PBBFAC,HCNC,, | Performed by: STUDENT IN AN ORGANIZED HEALTH CARE EDUCATION/TRAINING PROGRAM

## 2019-03-13 PROCEDURE — 99024 PR POST-OP FOLLOW-UP VISIT: ICD-10-PCS | Mod: HCNC,S$GLB,, | Performed by: STUDENT IN AN ORGANIZED HEALTH CARE EDUCATION/TRAINING PROGRAM

## 2019-03-13 PROCEDURE — 99999 PR PBB SHADOW E&M-EST. PATIENT-LVL III: ICD-10-PCS | Mod: PBBFAC,HCNC,, | Performed by: STUDENT IN AN ORGANIZED HEALTH CARE EDUCATION/TRAINING PROGRAM

## 2019-03-13 PROCEDURE — 99024 POSTOP FOLLOW-UP VISIT: CPT | Mod: HCNC,S$GLB,, | Performed by: STUDENT IN AN ORGANIZED HEALTH CARE EDUCATION/TRAINING PROGRAM

## 2019-03-13 NOTE — PROGRESS NOTES
S/p right forearm FB removal, palm spines 2 weeks ago  Feeling well  No pain  No discharge  Swelling improved since surgery    Incision well healed  nontender  No dc    rtc prn

## 2019-04-09 DIAGNOSIS — E55.9 VITAMIN D DEFICIENCY: ICD-10-CM

## 2019-04-09 RX ORDER — ERGOCALCIFEROL 1.25 MG/1
CAPSULE ORAL
Qty: 12 CAPSULE | Refills: 0 | Status: SHIPPED | OUTPATIENT
Start: 2019-04-09

## 2019-04-26 ENCOUNTER — LAB VISIT (OUTPATIENT)
Dept: LAB | Facility: HOSPITAL | Age: 69
End: 2019-04-26
Attending: FAMILY MEDICINE
Payer: MEDICARE

## 2019-04-26 DIAGNOSIS — E55.9 VITAMIN D DEFICIENCY: ICD-10-CM

## 2019-04-26 LAB — 25(OH)D3+25(OH)D2 SERPL-MCNC: 26 NG/ML (ref 30–96)

## 2019-04-26 PROCEDURE — 36415 COLL VENOUS BLD VENIPUNCTURE: CPT | Mod: HCNC,PO

## 2019-04-26 PROCEDURE — 82306 VITAMIN D 25 HYDROXY: CPT | Mod: HCNC

## 2020-04-07 ENCOUNTER — TELEPHONE (OUTPATIENT)
Dept: INTERNAL MEDICINE | Facility: CLINIC | Age: 70
End: 2020-04-07

## 2020-04-07 DIAGNOSIS — L40.9 PSORIASIS: ICD-10-CM

## 2020-04-07 DIAGNOSIS — Z00.00 PREVENTATIVE HEALTH CARE: Primary | ICD-10-CM

## 2020-04-07 DIAGNOSIS — Z12.5 PROSTATE CANCER SCREENING: ICD-10-CM

## 2020-04-07 DIAGNOSIS — Z80.8 FAMILY HISTORY OF MELANOMA: ICD-10-CM

## 2020-04-07 DIAGNOSIS — I45.10 RBBB: ICD-10-CM

## 2020-04-07 DIAGNOSIS — E55.9 VITAMIN D DEFICIENCY: ICD-10-CM

## 2020-04-07 DIAGNOSIS — I10 ESSENTIAL HYPERTENSION: ICD-10-CM

## 2020-04-07 DIAGNOSIS — S40.851A: ICD-10-CM

## 2020-04-07 NOTE — TELEPHONE ENCOUNTER
----- Message from Donna Orozco sent at 4/7/2020  6:42 AM CDT -----  Contact: Self [phone]  Pt came to lab today but no orders attached to appt pt states please call once lab orders are in. Thanks

## 2020-04-07 NOTE — TELEPHONE ENCOUNTER
----- Message from Donna Orozco sent at 4/7/2020  6:41 AM CDT -----  Contact: Self [phone]  Patient has an upcoming lab appointment at the [facility] on 04/07    Please enter lab orders and link them to this appointment.    Thanks,  [facility] 5th floor reception desk

## 2020-04-08 ENCOUNTER — LAB VISIT (OUTPATIENT)
Dept: LAB | Facility: HOSPITAL | Age: 70
End: 2020-04-08
Payer: MEDICARE

## 2020-04-08 DIAGNOSIS — E55.9 VITAMIN D DEFICIENCY: ICD-10-CM

## 2020-04-08 DIAGNOSIS — Z12.5 PROSTATE CANCER SCREENING: ICD-10-CM

## 2020-04-08 DIAGNOSIS — L40.9 PSORIASIS: ICD-10-CM

## 2020-04-08 DIAGNOSIS — Z80.8 FAMILY HISTORY OF MELANOMA: ICD-10-CM

## 2020-04-08 DIAGNOSIS — S40.851A: ICD-10-CM

## 2020-04-08 DIAGNOSIS — Z00.00 PREVENTATIVE HEALTH CARE: ICD-10-CM

## 2020-04-08 DIAGNOSIS — I10 ESSENTIAL HYPERTENSION: ICD-10-CM

## 2020-04-08 DIAGNOSIS — I45.10 RBBB: ICD-10-CM

## 2020-04-08 LAB
25(OH)D3+25(OH)D2 SERPL-MCNC: 23 NG/ML (ref 30–96)
ALBUMIN SERPL BCP-MCNC: 4.1 G/DL (ref 3.5–5.2)
ALP SERPL-CCNC: 85 U/L (ref 55–135)
ALT SERPL W/O P-5'-P-CCNC: 19 U/L (ref 10–44)
ANION GAP SERPL CALC-SCNC: 7 MMOL/L (ref 8–16)
AST SERPL-CCNC: 24 U/L (ref 10–40)
BASOPHILS # BLD AUTO: 0.05 K/UL (ref 0–0.2)
BASOPHILS NFR BLD: 0.9 % (ref 0–1.9)
BILIRUB SERPL-MCNC: 2 MG/DL (ref 0.1–1)
BUN SERPL-MCNC: 15 MG/DL (ref 8–23)
CALCIUM SERPL-MCNC: 9.5 MG/DL (ref 8.7–10.5)
CHLORIDE SERPL-SCNC: 104 MMOL/L (ref 95–110)
CHOLEST SERPL-MCNC: 141 MG/DL (ref 120–199)
CHOLEST/HDLC SERPL: 2.7 {RATIO} (ref 2–5)
CO2 SERPL-SCNC: 28 MMOL/L (ref 23–29)
COMPLEXED PSA SERPL-MCNC: 2 NG/ML (ref 0–4)
CREAT SERPL-MCNC: 1 MG/DL (ref 0.5–1.4)
DIFFERENTIAL METHOD: ABNORMAL
EOSINOPHIL # BLD AUTO: 0.1 K/UL (ref 0–0.5)
EOSINOPHIL NFR BLD: 1 % (ref 0–8)
ERYTHROCYTE [DISTWIDTH] IN BLOOD BY AUTOMATED COUNT: 12.8 % (ref 11.5–14.5)
EST. GFR  (AFRICAN AMERICAN): >60 ML/MIN/1.73 M^2
EST. GFR  (NON AFRICAN AMERICAN): >60 ML/MIN/1.73 M^2
GLUCOSE SERPL-MCNC: 92 MG/DL (ref 70–110)
HCT VFR BLD AUTO: 45.8 % (ref 40–54)
HDLC SERPL-MCNC: 53 MG/DL (ref 40–75)
HDLC SERPL: 37.6 % (ref 20–50)
HGB BLD-MCNC: 14.9 G/DL (ref 14–18)
IMM GRANULOCYTES # BLD AUTO: 0.01 K/UL (ref 0–0.04)
IMM GRANULOCYTES NFR BLD AUTO: 0.2 % (ref 0–0.5)
LDLC SERPL CALC-MCNC: 77.2 MG/DL (ref 63–159)
LYMPHOCYTES # BLD AUTO: 1.9 K/UL (ref 1–4.8)
LYMPHOCYTES NFR BLD: 32.6 % (ref 18–48)
MCH RBC QN AUTO: 32.4 PG (ref 27–31)
MCHC RBC AUTO-ENTMCNC: 32.5 G/DL (ref 32–36)
MCV RBC AUTO: 100 FL (ref 82–98)
MONOCYTES # BLD AUTO: 0.7 K/UL (ref 0.3–1)
MONOCYTES NFR BLD: 11.8 % (ref 4–15)
NEUTROPHILS # BLD AUTO: 3.1 K/UL (ref 1.8–7.7)
NEUTROPHILS NFR BLD: 53.5 % (ref 38–73)
NONHDLC SERPL-MCNC: 88 MG/DL
NRBC BLD-RTO: 0 /100 WBC
PLATELET # BLD AUTO: 288 K/UL (ref 150–350)
PMV BLD AUTO: 10.2 FL (ref 9.2–12.9)
POTASSIUM SERPL-SCNC: 4.3 MMOL/L (ref 3.5–5.1)
PROT SERPL-MCNC: 7.6 G/DL (ref 6–8.4)
RBC # BLD AUTO: 4.6 M/UL (ref 4.6–6.2)
SODIUM SERPL-SCNC: 139 MMOL/L (ref 136–145)
T4 FREE SERPL-MCNC: 0.92 NG/DL (ref 0.71–1.51)
TRIGL SERPL-MCNC: 54 MG/DL (ref 30–150)
TSH SERPL DL<=0.005 MIU/L-ACNC: 1.55 UIU/ML (ref 0.4–4)
WBC # BLD AUTO: 5.83 K/UL (ref 3.9–12.7)

## 2020-04-08 PROCEDURE — 80061 LIPID PANEL: CPT | Mod: HCNC

## 2020-04-08 PROCEDURE — 82306 VITAMIN D 25 HYDROXY: CPT | Mod: HCNC

## 2020-04-08 PROCEDURE — 84439 ASSAY OF FREE THYROXINE: CPT | Mod: HCNC

## 2020-04-08 PROCEDURE — 85025 COMPLETE CBC W/AUTO DIFF WBC: CPT | Mod: HCNC

## 2020-04-08 PROCEDURE — 84153 ASSAY OF PSA TOTAL: CPT | Mod: HCNC

## 2020-04-08 PROCEDURE — 84443 ASSAY THYROID STIM HORMONE: CPT | Mod: HCNC

## 2020-04-08 PROCEDURE — 36415 COLL VENOUS BLD VENIPUNCTURE: CPT | Mod: HCNC,PO

## 2020-04-08 PROCEDURE — 80053 COMPREHEN METABOLIC PANEL: CPT | Mod: HCNC

## 2020-04-16 ENCOUNTER — OFFICE VISIT (OUTPATIENT)
Dept: INTERNAL MEDICINE | Facility: CLINIC | Age: 70
End: 2020-04-16
Payer: MEDICARE

## 2020-04-16 VITALS
BODY MASS INDEX: 25.04 KG/M2 | SYSTOLIC BLOOD PRESSURE: 132 MMHG | HEART RATE: 63 BPM | TEMPERATURE: 99 F | WEIGHT: 169.06 LBS | DIASTOLIC BLOOD PRESSURE: 70 MMHG | HEIGHT: 69 IN

## 2020-04-16 DIAGNOSIS — I45.10 RBBB: ICD-10-CM

## 2020-04-16 DIAGNOSIS — I10 ESSENTIAL HYPERTENSION: ICD-10-CM

## 2020-04-16 DIAGNOSIS — Z00.00 WELL ADULT EXAM: Primary | ICD-10-CM

## 2020-04-16 DIAGNOSIS — E55.9 VITAMIN D DEFICIENCY: ICD-10-CM

## 2020-04-16 DIAGNOSIS — L40.9 PSORIASIS: ICD-10-CM

## 2020-04-16 PROCEDURE — 99397 PR PREVENTIVE VISIT,EST,65 & OVER: ICD-10-PCS | Mod: HCNC,S$GLB,, | Performed by: FAMILY MEDICINE

## 2020-04-16 PROCEDURE — 3078F DIAST BP <80 MM HG: CPT | Mod: HCNC,CPTII,S$GLB, | Performed by: FAMILY MEDICINE

## 2020-04-16 PROCEDURE — 99999 PR PBB SHADOW E&M-EST. PATIENT-LVL III: CPT | Mod: PBBFAC,HCNC,, | Performed by: FAMILY MEDICINE

## 2020-04-16 PROCEDURE — 99499 RISK ADDL DX/OHS AUDIT: ICD-10-PCS | Mod: S$GLB,,, | Performed by: FAMILY MEDICINE

## 2020-04-16 PROCEDURE — 99999 PR PBB SHADOW E&M-EST. PATIENT-LVL III: ICD-10-PCS | Mod: PBBFAC,HCNC,, | Performed by: FAMILY MEDICINE

## 2020-04-16 PROCEDURE — 99397 PER PM REEVAL EST PAT 65+ YR: CPT | Mod: HCNC,S$GLB,, | Performed by: FAMILY MEDICINE

## 2020-04-16 PROCEDURE — 3075F SYST BP GE 130 - 139MM HG: CPT | Mod: HCNC,CPTII,S$GLB, | Performed by: FAMILY MEDICINE

## 2020-04-16 PROCEDURE — 3075F PR MOST RECENT SYSTOLIC BLOOD PRESS GE 130-139MM HG: ICD-10-PCS | Mod: HCNC,CPTII,S$GLB, | Performed by: FAMILY MEDICINE

## 2020-04-16 PROCEDURE — 99499 UNLISTED E&M SERVICE: CPT | Mod: S$GLB,,, | Performed by: FAMILY MEDICINE

## 2020-04-16 PROCEDURE — 3078F PR MOST RECENT DIASTOLIC BLOOD PRESSURE < 80 MM HG: ICD-10-PCS | Mod: HCNC,CPTII,S$GLB, | Performed by: FAMILY MEDICINE

## 2020-04-16 RX ORDER — IRBESARTAN 150 MG/1
150 TABLET ORAL NIGHTLY
Qty: 90 TABLET | Refills: 3 | Status: SHIPPED | OUTPATIENT
Start: 2020-04-16

## 2020-09-29 ENCOUNTER — PATIENT MESSAGE (OUTPATIENT)
Dept: OTHER | Facility: OTHER | Age: 70
End: 2020-09-29

## 2020-10-07 ENCOUNTER — PES CALL (OUTPATIENT)
Dept: ADMINISTRATIVE | Facility: CLINIC | Age: 70
End: 2020-10-07

## 2020-12-11 ENCOUNTER — PATIENT MESSAGE (OUTPATIENT)
Dept: OTHER | Facility: OTHER | Age: 70
End: 2020-12-11

## 2021-04-26 ENCOUNTER — PATIENT MESSAGE (OUTPATIENT)
Dept: RESEARCH | Facility: HOSPITAL | Age: 71
End: 2021-04-26

## 2021-07-06 ENCOUNTER — PATIENT MESSAGE (OUTPATIENT)
Dept: ADMINISTRATIVE | Facility: HOSPITAL | Age: 71
End: 2021-07-06

## 2021-10-04 ENCOUNTER — PATIENT MESSAGE (OUTPATIENT)
Dept: ADMINISTRATIVE | Facility: HOSPITAL | Age: 71
End: 2021-10-04

## 2022-01-25 ENCOUNTER — PATIENT MESSAGE (OUTPATIENT)
Dept: ADMINISTRATIVE | Facility: HOSPITAL | Age: 72
End: 2022-01-25
Payer: MEDICARE

## 2022-03-16 ENCOUNTER — PATIENT MESSAGE (OUTPATIENT)
Dept: ADMINISTRATIVE | Facility: HOSPITAL | Age: 72
End: 2022-03-16
Payer: MEDICARE

## 2023-11-20 NOTE — PROGRESS NOTES
Subjective:   Patient ID:  Joseph Rizvi Jr. is a 67 y.o. male who presents for follow up of Varicose Veins; Spider Veins; and Chronic Venous Insufficiency      HPI:       Joseph Rizvi Jr. 67 y.o. male is here follow up and feeling well without any new complaints. He reports improvement of his leg cramps, use compression stocking. He denies major swelling.  He can walk with no limitation. His venous doppler shows very mild chronic venous insufficiency in his left PTV only it doesn't correlate with any symptoms or his presentation.         Patient Active Problem List    Diagnosis Date Noted    Venous insufficiency 2016    RBBB 2016    HTN (hypertension) 2015    Psoriasis 2015           Right Arm BP - Sittin/70  Left Arm BP - Sittin/79        LABS      Lipid panel  Lab Results   Component Value Date    CHOL 131 2017    CHOL 129 2016    CHOL 123 2016     Lab Results   Component Value Date    HDL 39 (L) 2017    HDL 43 2016    HDL 43 2016     Lab Results   Component Value Date    LDLCALC 76.6 2017    LDLCALC 73.4 2016    LDLCALC 71.4 2016     Lab Results   Component Value Date    TRIG 77 2017    TRIG 63 2016    TRIG 43 2016     Lab Results   Component Value Date    CHOLHDL 29.8 2017    CHOLHDL 33.3 2016    CHOLHDL 35.0 2016            Review of Systems   Constitution: Negative for diaphoresis, weakness, night sweats, weight gain and weight loss.   HENT: Negative for congestion.    Eyes: Negative for blurred vision, discharge and double vision.   Cardiovascular: Negative for chest pain, claudication, cyanosis, dyspnea on exertion, irregular heartbeat, leg swelling, near-syncope, orthopnea, palpitations, paroxysmal nocturnal dyspnea and syncope.   Respiratory: Negative for cough, shortness of breath and wheezing.    Endocrine: Negative for cold intolerance, heat intolerance and polyphagia.    Hematologic/Lymphatic: Negative for adenopathy and bleeding problem. Does not bruise/bleed easily.   Skin: Negative for dry skin and nail changes.   Musculoskeletal: Negative for arthritis, back pain, falls, joint pain, myalgias and neck pain.   Gastrointestinal: Negative for bloating, abdominal pain, change in bowel habit and constipation.   Genitourinary: Negative for bladder incontinence, dysuria, flank pain, genital sores and missed menses.   Neurological: Negative for aphonia, brief paralysis, difficulty with concentration and dizziness.   Psychiatric/Behavioral: Negative for altered mental status and memory loss. The patient does not have insomnia.    Allergic/Immunologic: Negative for environmental allergies.       Objective:   Physical Exam   Constitutional: He is oriented to person, place, and time. He appears well-developed and well-nourished. He is not intubated.   HENT:   Head: Normocephalic and atraumatic.   Right Ear: External ear normal.   Left Ear: External ear normal.   Mouth/Throat: Oropharynx is clear and moist.   Eyes: Conjunctivae and EOM are normal. Pupils are equal, round, and reactive to light. Right eye exhibits no discharge. Left eye exhibits no discharge. No scleral icterus.   Neck: Normal range of motion. Neck supple. Normal carotid pulses, no hepatojugular reflux and no JVD present. Carotid bruit is not present. No tracheal deviation present. No thyromegaly present.   Cardiovascular: Normal rate, regular rhythm, S1 normal and S2 normal.   No extrasystoles are present. PMI is not displaced.  Exam reveals no gallop, no S3, no distant heart sounds, no friction rub and no midsystolic click.    No murmur heard.  Pulses:       Carotid pulses are 2+ on the right side, and 2+ on the left side.       Radial pulses are 2+ on the right side, and 2+ on the left side.        Femoral pulses are 2+ on the right side, and 2+ on the left side.       Popliteal pulses are 2+ on the right side, and 2+ on  the left side.        Dorsalis pedis pulses are 2+ on the right side, and 2+ on the left side.        Posterior tibial pulses are 2+ on the right side, and 2+ on the left side.   Pulmonary/Chest: Effort normal and breath sounds normal. No accessory muscle usage or stridor. No apnea, no tachypnea and no bradypnea. He is not intubated. No respiratory distress. He has no decreased breath sounds. He has no wheezes. He has no rales. He exhibits no tenderness and no bony tenderness.   Abdominal: He exhibits no distension, no pulsatile liver, no abdominal bruit, no ascites, no pulsatile midline mass and no mass. There is no tenderness. There is no rebound and no guarding.   Musculoskeletal: Normal range of motion. He exhibits no edema or tenderness.   Lymphadenopathy:     He has no cervical adenopathy.   Neurological: He is alert and oriented to person, place, and time. He has normal reflexes. No cranial nerve deficit. Coordination normal.   Skin: Skin is warm. No rash noted. No erythema. No pallor.     Spider veins-both ankles  No ulcers  Mild hyperpigmentation   Psychiatric: He has a normal mood and affect. His behavior is normal. Judgment and thought content normal.       Assessment:     1. Essential hypertension    2. Venous insufficiency    3. RBBB        Plan:         Compression 20-30 mmHg  Elevate legs when sitting and sleeping        Continue with current medical plan and lifestyle changes.  Return sooner for concerns or questions. If symptoms persist go to the ED  I have reviewed all pertinent data on this patient       I have reviewed the patient's medical history in detail and updated the computerized patient record.    Orders Placed This Encounter   Procedures    US Lower Extremity Veins Bilateral Insuf     Standing Status:   Future     Standing Expiration Date:   7/24/2018     Order Specific Question:   May the Radiologist modify the order per protocol to meet the clinical needs of the patient?     Answer:    Yes       Follow up as scheduled. Return sooner for concerns or questions            He expressed verbal understanding and agreed with the plan        Patient's Medications   New Prescriptions    No medications on file   Previous Medications    CYANOCOBALAMIN (VITAMIN B-12) 1000 MCG TABLET    Take 100 mcg by mouth once daily.    IRBESARTAN (AVAPRO) 150 MG TABLET    Take 1 tablet (150 mg total) by mouth every evening.    VITAMIN E 200 UNIT CAPSULE    Take 200 Units by mouth once daily.   Modified Medications    No medications on file   Discontinued Medications    No medications on file       [facility]

## 2025-03-12 NOTE — TELEPHONE ENCOUNTER
----- Message from Donna Orozco sent at 8/15/2017  8:53 AM CDT -----  Contact: Self [phone]  Patient would like to get a referral.  Does the patient already have the specialty clinic appointment scheduled:    If yes, what date is the appointment scheduled:   no  Referral to what specialty:  Podiatry  Reason (be specific):  Toe Infection   Does the patient want the referral with a specific physician:  no  Is this [facility]:    Comments:         chart reviewed.

## 2025-06-08 NOTE — PROGRESS NOTES
Continue Procardia XL and monitor blood pressures   Subjective:       Patient ID: Joseph Rizvi Jr. is a 70 y.o. male.    Chief Complaint: Annual Exam    HPI 70-year-old white male presents to clinic today for annual physical exam.  He continues to have well-controlled hypertension on Avapro 150 mg daily.  Right bundle-branch block remains stable.  He continues to be followed by dermatology for treatment of psoriasis.  He has a past surgical history of lipoma removal and foreign body removal from the arm.  He has a family history of schizophrenia.  His mother passed away from overdose.  His father passed away from dementia and hypertension.  He also has a family history of melanoma.  He is up-to-date with all vaccinations.  Review of Systems   Constitutional: Negative for appetite change, chills, fatigue and fever.   HENT: Negative for congestion, ear pain, hearing loss, postnasal drip, rhinorrhea, sinus pressure, sore throat and tinnitus.    Eyes: Negative for redness, itching and visual disturbance.   Respiratory: Negative for cough, chest tightness and shortness of breath.    Cardiovascular: Negative for chest pain and palpitations.   Gastrointestinal: Negative for abdominal pain, constipation, diarrhea, nausea and vomiting.   Genitourinary: Negative for decreased urine volume, difficulty urinating, dysuria, frequency, hematuria and urgency.   Musculoskeletal: Positive for arthralgias (right knee). Negative for back pain, myalgias, neck pain and neck stiffness.   Skin: Negative for rash.   Neurological: Negative for dizziness, light-headedness and headaches.   Psychiatric/Behavioral: Negative.        Objective:      Physical Exam   Constitutional: He is oriented to person, place, and time. He appears well-developed and well-nourished. No distress.   HENT:   Head: Normocephalic and atraumatic.   Right Ear: External ear normal.   Left Ear: External ear normal.   Nose: Nose normal.   Mouth/Throat: Oropharynx is clear and moist. No oropharyngeal exudate.   Eyes:  Pupils are equal, round, and reactive to light. Conjunctivae and EOM are normal. Right eye exhibits no discharge. Left eye exhibits no discharge. No scleral icterus.   Neck: Normal range of motion. Neck supple. No JVD present. No tracheal deviation present. No thyromegaly present.   Cardiovascular: Normal rate, regular rhythm, normal heart sounds and intact distal pulses. Exam reveals no gallop and no friction rub.   No murmur heard.  Pulmonary/Chest: Effort normal and breath sounds normal. No stridor. No respiratory distress. He has no wheezes. He has no rales.   Abdominal: Soft. Bowel sounds are normal. He exhibits no distension and no mass. There is no tenderness. There is no rebound and no guarding.   Musculoskeletal: Normal range of motion. He exhibits no edema or tenderness.   Lymphadenopathy:     He has no cervical adenopathy.   Neurological: He is alert and oriented to person, place, and time.   Skin: Skin is warm and dry. No rash noted. He is not diaphoretic. No erythema. No pallor.   Psychiatric: He has a normal mood and affect. His behavior is normal. Judgment and thought content normal.   Nursing note and vitals reviewed.      Assessment:       1. Well adult exam    2. Essential hypertension    3. RBBB    4. Psoriasis    5. Vitamin D deficiency        Plan:       1.  Labs have been reviewed and are overall within normal limits except for a mildly low vitamin-D level.  2.  Continue Avapro 150 mg daily.  Hypertension is well controlled.  3.  Right bundle branch block remained stable.  4.  Recommend use of hydrocortisone and continued follow-up with dermatology for treatment of psoriasis.  5.  Recommend over-the-counter vitamin-D 2000 units daily.  Vitamin-D deficiency is stable.  6.  Return to clinic as needed or in 1 year for annual exam.

## (undated) DEVICE — COVER OVERHEAD SURG LT BLUE

## (undated) DEVICE — DRESSING TRANS 4X4 3/4

## (undated) DEVICE — BANDAGE CONFORM 3IN STRL

## (undated) DEVICE — ELECTRODE REM PLYHSV RETURN 9

## (undated) DEVICE — PACK BASIC

## (undated) DEVICE — SUT VICRYL 3-0 27 SH

## (undated) DEVICE — APPLICATOR CHLORAPREP ORN 26ML

## (undated) DEVICE — SHEET THYROID W/ISO-BAC

## (undated) DEVICE — SEE MEDLINE ITEM 157116

## (undated) DEVICE — ADHESIVE MASTISOL VIAL 48/BX

## (undated) DEVICE — SUT MCRYL PLUS 4-0 PS2 27IN

## (undated) DEVICE — GAUZE SPONGE 4X4 12PLY

## (undated) DEVICE — CLOSURE STERI STRIP .5X4IN TAN

## (undated) DEVICE — NDL HYPO REG 25G X 1 1/2

## (undated) DEVICE — GLOVE PROTEXIS HYDROGEL SZ7

## (undated) DEVICE — PAD PREP 50/CA

## (undated) DEVICE — SYR 10CC LUER LOCK

## (undated) DEVICE — MANIFOLD 4 PORT

## (undated) DEVICE — SEE MEDLINE ITEM 156955

## (undated) DEVICE — SEE MEDLINE ITEM 152622